# Patient Record
Sex: FEMALE | Race: BLACK OR AFRICAN AMERICAN | NOT HISPANIC OR LATINO | Employment: OTHER | URBAN - METROPOLITAN AREA
[De-identification: names, ages, dates, MRNs, and addresses within clinical notes are randomized per-mention and may not be internally consistent; named-entity substitution may affect disease eponyms.]

---

## 2023-02-22 LAB
ALANINE AMINOTRANSFERASE (SGPT) (U/L) IN SER/PLAS: 10 U/L (ref 7–45)
ALBUMIN (G/DL) IN SER/PLAS: 3.8 G/DL (ref 3.4–5)
ALKALINE PHOSPHATASE (U/L) IN SER/PLAS: 81 U/L (ref 33–136)
ANION GAP IN SER/PLAS: 15 MMOL/L (ref 10–20)
ASPARTATE AMINOTRANSFERASE (SGOT) (U/L) IN SER/PLAS: 13 U/L (ref 9–39)
BILIRUBIN TOTAL (MG/DL) IN SER/PLAS: 0.5 MG/DL (ref 0–1.2)
CALCIDIOL (25 OH VITAMIN D3) (NG/ML) IN SER/PLAS: 27 NG/ML
CALCIUM (MG/DL) IN SER/PLAS: 9.3 MG/DL (ref 8.6–10.6)
CARBON DIOXIDE, TOTAL (MMOL/L) IN SER/PLAS: 27 MMOL/L (ref 21–32)
CHLORIDE (MMOL/L) IN SER/PLAS: 103 MMOL/L (ref 98–107)
CHOLESTEROL (MG/DL) IN SER/PLAS: 202 MG/DL (ref 0–199)
CHOLESTEROL IN HDL (MG/DL) IN SER/PLAS: 45.4 MG/DL
CHOLESTEROL/HDL RATIO: 4.4
CREATININE (MG/DL) IN SER/PLAS: 1.13 MG/DL (ref 0.5–1.05)
GFR FEMALE: 52 ML/MIN/1.73M2
GLUCOSE (MG/DL) IN SER/PLAS: 90 MG/DL (ref 74–99)
LDL: 133 MG/DL (ref 0–99)
POTASSIUM (MMOL/L) IN SER/PLAS: 3.8 MMOL/L (ref 3.5–5.3)
PROTEIN TOTAL: 7.3 G/DL (ref 6.4–8.2)
SODIUM (MMOL/L) IN SER/PLAS: 141 MMOL/L (ref 136–145)
TRIGLYCERIDE (MG/DL) IN SER/PLAS: 119 MG/DL (ref 0–149)
UREA NITROGEN (MG/DL) IN SER/PLAS: 16 MG/DL (ref 6–23)
VLDL: 24 MG/DL (ref 0–40)

## 2023-03-22 DIAGNOSIS — I10 HYPERTENSION, UNSPECIFIED TYPE: ICD-10-CM

## 2023-03-22 RX ORDER — CHLORTHALIDONE 25 MG/1
1 TABLET ORAL DAILY
COMMUNITY
Start: 2022-06-20 | End: 2023-03-22 | Stop reason: SDUPTHER

## 2023-03-22 RX ORDER — CHOLECALCIFEROL (VITAMIN D3) 25 MCG
1 TABLET ORAL DAILY
COMMUNITY
Start: 2013-06-17 | End: 2023-05-23 | Stop reason: SDUPTHER

## 2023-03-22 RX ORDER — LOSARTAN POTASSIUM 100 MG/1
100 TABLET ORAL DAILY
Qty: 90 TABLET | Refills: 3 | Status: SHIPPED | OUTPATIENT
Start: 2023-03-22 | End: 2023-05-23 | Stop reason: SDUPTHER

## 2023-03-22 RX ORDER — MELOXICAM 15 MG/1
1 TABLET ORAL DAILY
COMMUNITY
Start: 2012-05-24 | End: 2023-05-23 | Stop reason: ALTCHOICE

## 2023-03-22 RX ORDER — CHLORTHALIDONE 25 MG/1
25 TABLET ORAL DAILY
Qty: 90 TABLET | Refills: 3 | Status: SHIPPED | OUTPATIENT
Start: 2023-03-22 | End: 2023-05-23 | Stop reason: SDUPTHER

## 2023-03-22 RX ORDER — LOSARTAN POTASSIUM 100 MG/1
1 TABLET ORAL DAILY
COMMUNITY
Start: 2012-07-16 | End: 2023-03-22 | Stop reason: SDUPTHER

## 2023-05-23 ENCOUNTER — OFFICE VISIT (OUTPATIENT)
Dept: PRIMARY CARE | Facility: CLINIC | Age: 73
End: 2023-05-23
Payer: MEDICARE

## 2023-05-23 VITALS
SYSTOLIC BLOOD PRESSURE: 112 MMHG | OXYGEN SATURATION: 97 % | WEIGHT: 205 LBS | BODY MASS INDEX: 34.16 KG/M2 | HEART RATE: 67 BPM | DIASTOLIC BLOOD PRESSURE: 60 MMHG | HEIGHT: 65 IN | RESPIRATION RATE: 12 BRPM

## 2023-05-23 DIAGNOSIS — H93.13 TINNITUS OF BOTH EARS: ICD-10-CM

## 2023-05-23 DIAGNOSIS — R06.00 DYSPNEA, UNSPECIFIED TYPE: ICD-10-CM

## 2023-05-23 DIAGNOSIS — I15.9 SECONDARY HYPERTENSION: ICD-10-CM

## 2023-05-23 DIAGNOSIS — I10 HYPERTENSION, UNSPECIFIED TYPE: ICD-10-CM

## 2023-05-23 DIAGNOSIS — R94.31 ABNORMAL EKG: ICD-10-CM

## 2023-05-23 DIAGNOSIS — E55.9 VITAMIN D DEFICIENCY: ICD-10-CM

## 2023-05-23 DIAGNOSIS — E78.5 HYPERLIPIDEMIA, UNSPECIFIED HYPERLIPIDEMIA TYPE: ICD-10-CM

## 2023-05-23 DIAGNOSIS — R73.9 HYPERGLYCEMIA: ICD-10-CM

## 2023-05-23 DIAGNOSIS — R42 DIZZINESS: Primary | ICD-10-CM

## 2023-05-23 DIAGNOSIS — M17.9 OSTEOARTHRITIS OF KNEE, UNSPECIFIED LATERALITY, UNSPECIFIED OSTEOARTHRITIS TYPE: ICD-10-CM

## 2023-05-23 DIAGNOSIS — E66.9 OBESITY, UNSPECIFIED CLASSIFICATION, UNSPECIFIED OBESITY TYPE, UNSPECIFIED WHETHER SERIOUS COMORBIDITY PRESENT: ICD-10-CM

## 2023-05-23 PROBLEM — C50.919 BREAST CANCER (MULTI): Status: ACTIVE | Noted: 2023-05-23

## 2023-05-23 PROBLEM — H53.8 BLURRY VISION: Status: ACTIVE | Noted: 2023-05-23

## 2023-05-23 PROBLEM — L60.0 INGROWN TOENAIL: Status: ACTIVE | Noted: 2023-05-23

## 2023-05-23 PROBLEM — M79.674 PAIN OF TOE OF RIGHT FOOT: Status: ACTIVE | Noted: 2018-11-01

## 2023-05-23 PROBLEM — M21.6X9 ACQUIRED EQUINUS DEFORMITY OF FOOT: Status: ACTIVE | Noted: 2018-11-01

## 2023-05-23 PROBLEM — M54.50 LUMBAGO: Status: ACTIVE | Noted: 2023-05-23

## 2023-05-23 PROBLEM — M19.90 OSTEOARTHROSIS: Status: ACTIVE | Noted: 2023-05-23

## 2023-05-23 PROBLEM — R51.9 HEADACHE: Status: ACTIVE | Noted: 2023-05-23

## 2023-05-23 PROBLEM — M25.559 HIP PAIN: Status: ACTIVE | Noted: 2023-05-23

## 2023-05-23 PROBLEM — M51.36 DEGENERATION OF LUMBAR INTERVERTEBRAL DISC: Status: ACTIVE | Noted: 2023-05-23

## 2023-05-23 PROBLEM — M51.369 DEGENERATION OF LUMBAR INTERVERTEBRAL DISC: Status: ACTIVE | Noted: 2023-05-23

## 2023-05-23 PROBLEM — H81.399 PERIPHERAL VERTIGO: Status: ACTIVE | Noted: 2023-05-23

## 2023-05-23 PROBLEM — M47.812 SPONDYLOSIS OF CERVICAL REGION WITHOUT MYELOPATHY OR RADICULOPATHY: Status: ACTIVE | Noted: 2023-05-23

## 2023-05-23 PROBLEM — R43.2 TASTE IMPAIRMENT: Status: ACTIVE | Noted: 2023-05-23

## 2023-05-23 PROBLEM — M72.2 PLANTAR FASCIITIS: Status: ACTIVE | Noted: 2018-11-01

## 2023-05-23 PROBLEM — R25.2 MUSCLE CRAMP: Status: ACTIVE | Noted: 2023-05-23

## 2023-05-23 PROCEDURE — 1036F TOBACCO NON-USER: CPT | Performed by: INTERNAL MEDICINE

## 2023-05-23 PROCEDURE — 99214 OFFICE O/P EST MOD 30 MIN: CPT | Performed by: INTERNAL MEDICINE

## 2023-05-23 PROCEDURE — 3078F DIAST BP <80 MM HG: CPT | Performed by: INTERNAL MEDICINE

## 2023-05-23 PROCEDURE — 3074F SYST BP LT 130 MM HG: CPT | Performed by: INTERNAL MEDICINE

## 2023-05-23 PROCEDURE — 1159F MED LIST DOCD IN RCRD: CPT | Performed by: INTERNAL MEDICINE

## 2023-05-23 RX ORDER — LOSARTAN POTASSIUM 100 MG/1
100 TABLET ORAL DAILY
Qty: 90 TABLET | Refills: 3 | Status: SHIPPED | OUTPATIENT
Start: 2023-05-23

## 2023-05-23 RX ORDER — CHOLECALCIFEROL (VITAMIN D3) 25 MCG
25 TABLET ORAL DAILY
Qty: 90 TABLET | Refills: 3 | Status: SHIPPED | OUTPATIENT
Start: 2023-05-23

## 2023-05-23 RX ORDER — CHLORTHALIDONE 25 MG/1
25 TABLET ORAL DAILY
Qty: 90 TABLET | Refills: 3 | Status: SHIPPED | OUTPATIENT
Start: 2023-05-23

## 2023-05-23 NOTE — PROGRESS NOTES
"Subjective   Chief complaint: Christine Fong is a 72 y.o. female who presents for Follow-up (Pt is here for follow up on ringing in ears and dizziness. ).    HPI:  72 years old black female history of hypertension, hyperlipidemia she has been experiencing some ringing in her ears with some dizziness she had a wax last visit and she had it removed patient still feels the ear is popping in and out.  Patient is concerned about her heart status or cardiac status because her EKG was slightly abnormal she has dyspnea exertion and she would like to have more work-up done to be sure that she is okay as far as her heart.        Objective   /60   Pulse 67   Resp 12   Ht 1.651 m (5' 5\")   Wt 93 kg (205 lb)   SpO2 97%   BMI 34.11 kg/m²   Physical Exam  Vitals reviewed.   Constitutional:       Appearance: Normal appearance.   HENT:      Head: Normocephalic and atraumatic.   Cardiovascular:      Rate and Rhythm: Normal rate and regular rhythm.   Pulmonary:      Effort: Pulmonary effort is normal.      Breath sounds: Normal breath sounds.   Abdominal:      General: Bowel sounds are normal.      Palpations: Abdomen is soft.   Musculoskeletal:      Cervical back: Neck supple.   Skin:     General: Skin is warm and dry.   Neurological:      General: No focal deficit present.      Mental Status: She is alert.   Psychiatric:         Mood and Affect: Mood normal.         Behavior: Behavior is cooperative.         I have reviewed and reconciled the medication list with the patient today.   Current Outpatient Medications:     chlorthalidone (Hygroton) 25 mg tablet, Take 1 tablet (25 mg) by mouth once daily., Disp: 90 tablet, Rfl: 3    cholecalciferol (Vitamin D-3) 25 MCG (1000 UT) tablet, Take 1 tablet (25 mcg) by mouth once daily., Disp: , Rfl:     losartan (Cozaar) 100 mg tablet, Take 1 tablet (100 mg) by mouth once daily., Disp: 90 tablet, Rfl: 3     Imaging:  No results found.     Labs reviewed:    Lab Results   Component " Value Date    WBC 5.2 11/03/2022    HGB 12.1 11/03/2022    HCT 37.6 11/03/2022     11/03/2022    CHOL 202 (H) 02/22/2023    TRIG 119 02/22/2023    HDL 45.4 02/22/2023    ALT 10 02/22/2023    AST 13 02/22/2023     02/22/2023    K 3.8 02/22/2023     02/22/2023    CREATININE 1.13 (H) 02/22/2023    BUN 16 02/22/2023    CO2 27 02/22/2023    TSH 1.32 11/03/2022    HGBA1C 5.2 11/03/2022       Assessment/Plan   Problem List Items Addressed This Visit          Respiratory    Dyspnea     Stress test and echocardiogram            Circulatory    Hypertension     Continue losartan and chlorthalidone Hygroton for diuretics low-salt diet            Musculoskeletal    Osteoarthrosis       Endocrine/Metabolic    Obesity       Other    Dizziness - Primary     Lightheadedness off and on there is no really evidence of vertigo.         Hyperglycemia    Hyperlipidemia     Following low-fat diet and taking flaxseed         Tinnitus of both ears       Continue current medications as listed  Follow up in in a week to do a CMP lipid profile and vitamin D.

## 2023-05-26 ENCOUNTER — LAB (OUTPATIENT)
Dept: LAB | Facility: LAB | Age: 73
End: 2023-05-26
Payer: MEDICARE

## 2023-05-26 DIAGNOSIS — I10 HYPERTENSION, UNSPECIFIED TYPE: ICD-10-CM

## 2023-05-26 DIAGNOSIS — E55.9 VITAMIN D DEFICIENCY: ICD-10-CM

## 2023-05-26 LAB
ALANINE AMINOTRANSFERASE (SGPT) (U/L) IN SER/PLAS: 14 U/L (ref 7–45)
ALBUMIN (G/DL) IN SER/PLAS: 3.9 G/DL (ref 3.4–5)
ALKALINE PHOSPHATASE (U/L) IN SER/PLAS: 83 U/L (ref 33–136)
ANION GAP IN SER/PLAS: 12 MMOL/L (ref 10–20)
ASPARTATE AMINOTRANSFERASE (SGOT) (U/L) IN SER/PLAS: 16 U/L (ref 9–39)
BILIRUBIN TOTAL (MG/DL) IN SER/PLAS: 0.7 MG/DL (ref 0–1.2)
CALCIDIOL (25 OH VITAMIN D3) (NG/ML) IN SER/PLAS: 35 NG/ML
CALCIUM (MG/DL) IN SER/PLAS: 9.4 MG/DL (ref 8.6–10.6)
CARBON DIOXIDE, TOTAL (MMOL/L) IN SER/PLAS: 29 MMOL/L (ref 21–32)
CHLORIDE (MMOL/L) IN SER/PLAS: 103 MMOL/L (ref 98–107)
CHOLESTEROL (MG/DL) IN SER/PLAS: 207 MG/DL (ref 0–199)
CHOLESTEROL IN HDL (MG/DL) IN SER/PLAS: 50.4 MG/DL
CHOLESTEROL/HDL RATIO: 4.1
CREATININE (MG/DL) IN SER/PLAS: 0.93 MG/DL (ref 0.5–1.05)
GFR FEMALE: 66 ML/MIN/1.73M2
GLUCOSE (MG/DL) IN SER/PLAS: 87 MG/DL (ref 74–99)
LDL: 130 MG/DL (ref 0–99)
POTASSIUM (MMOL/L) IN SER/PLAS: 3.5 MMOL/L (ref 3.5–5.3)
PROTEIN TOTAL: 7.3 G/DL (ref 6.4–8.2)
SODIUM (MMOL/L) IN SER/PLAS: 140 MMOL/L (ref 136–145)
TRIGLYCERIDE (MG/DL) IN SER/PLAS: 132 MG/DL (ref 0–149)
UREA NITROGEN (MG/DL) IN SER/PLAS: 13 MG/DL (ref 6–23)
VLDL: 26 MG/DL (ref 0–40)

## 2023-05-26 PROCEDURE — 82306 VITAMIN D 25 HYDROXY: CPT

## 2023-05-26 PROCEDURE — 80053 COMPREHEN METABOLIC PANEL: CPT

## 2023-05-26 PROCEDURE — 80061 LIPID PANEL: CPT

## 2023-05-26 PROCEDURE — 36415 COLL VENOUS BLD VENIPUNCTURE: CPT

## 2023-05-31 ENCOUNTER — OFFICE VISIT (OUTPATIENT)
Dept: PRIMARY CARE | Facility: CLINIC | Age: 73
End: 2023-05-31
Payer: MEDICARE

## 2023-05-31 VITALS
HEIGHT: 65 IN | OXYGEN SATURATION: 96 % | RESPIRATION RATE: 12 BRPM | DIASTOLIC BLOOD PRESSURE: 62 MMHG | BODY MASS INDEX: 34.32 KG/M2 | HEART RATE: 70 BPM | SYSTOLIC BLOOD PRESSURE: 100 MMHG | WEIGHT: 206 LBS

## 2023-05-31 DIAGNOSIS — E55.9 VITAMIN D DEFICIENCY: ICD-10-CM

## 2023-05-31 DIAGNOSIS — E78.5 HYPERLIPIDEMIA, UNSPECIFIED HYPERLIPIDEMIA TYPE: ICD-10-CM

## 2023-05-31 DIAGNOSIS — Z00.00 ANNUAL PHYSICAL EXAM: ICD-10-CM

## 2023-05-31 DIAGNOSIS — I15.9 SECONDARY HYPERTENSION: ICD-10-CM

## 2023-05-31 DIAGNOSIS — M17.9 OSTEOARTHRITIS OF KNEE, UNSPECIFIED LATERALITY, UNSPECIFIED OSTEOARTHRITIS TYPE: ICD-10-CM

## 2023-05-31 DIAGNOSIS — R73.9 HYPERGLYCEMIA: ICD-10-CM

## 2023-05-31 DIAGNOSIS — C50.919 MALIGNANT NEOPLASM OF FEMALE BREAST, UNSPECIFIED ESTROGEN RECEPTOR STATUS, UNSPECIFIED LATERALITY, UNSPECIFIED SITE OF BREAST (MULTI): ICD-10-CM

## 2023-05-31 DIAGNOSIS — I15.9 SECONDARY HYPERTENSION: Primary | ICD-10-CM

## 2023-05-31 PROCEDURE — 1159F MED LIST DOCD IN RCRD: CPT | Performed by: INTERNAL MEDICINE

## 2023-05-31 PROCEDURE — 3078F DIAST BP <80 MM HG: CPT | Performed by: INTERNAL MEDICINE

## 2023-05-31 PROCEDURE — 3074F SYST BP LT 130 MM HG: CPT | Performed by: INTERNAL MEDICINE

## 2023-05-31 PROCEDURE — 99214 OFFICE O/P EST MOD 30 MIN: CPT | Performed by: INTERNAL MEDICINE

## 2023-05-31 PROCEDURE — 1036F TOBACCO NON-USER: CPT | Performed by: INTERNAL MEDICINE

## 2023-05-31 NOTE — PROGRESS NOTES
"Subjective   Chief complaint: Christine Fong is a 72 y.o. female who presents for Follow-up (Pt is being seen today for blood work follow up).    HPI:  Christine has a history of Htn and HLD is here to discuss blood work today. No current complainants.        Objective   /62   Pulse 70   Resp 12   Ht 1.651 m (5' 5\")   Wt 93.4 kg (206 lb)   SpO2 96%   BMI 34.28 kg/m²   Physical Exam  Vitals reviewed.   Constitutional:       Appearance: Normal appearance.   HENT:      Head: Normocephalic and atraumatic.      Mouth/Throat:      Mouth: Mucous membranes are moist.   Eyes:      Conjunctiva/sclera: Conjunctivae normal.      Pupils: Pupils are equal, round, and reactive to light.   Cardiovascular:      Rate and Rhythm: Normal rate.      Pulses: Normal pulses.   Pulmonary:      Effort: Pulmonary effort is normal.      Breath sounds: Normal breath sounds.   Abdominal:      General: Abdomen is flat.      Palpations: Abdomen is soft.   Musculoskeletal:      Cervical back: Normal range of motion.   Skin:     General: Skin is warm and dry.   Neurological:      Mental Status: She is alert and oriented to person, place, and time.   Psychiatric:         Mood and Affect: Mood normal.         Behavior: Behavior normal.         I have reviewed and reconciled the medication list with the patient today.   Current Outpatient Medications:     chlorthalidone (Hygroton) 25 mg tablet, Take 1 tablet (25 mg) by mouth once daily., Disp: 90 tablet, Rfl: 3    cholecalciferol (Vitamin D-3) 25 MCG (1000 UT) tablet, Take 1 tablet (25 mcg) by mouth once daily., Disp: 90 tablet, Rfl: 3    losartan (Cozaar) 100 mg tablet, Take 1 tablet (100 mg) by mouth once daily., Disp: 90 tablet, Rfl: 3     Imaging:  No results found.     Labs reviewed:    Lab Results   Component Value Date    WBC 5.2 11/03/2022    HGB 12.1 11/03/2022    HCT 37.6 11/03/2022     11/03/2022    CHOL 207 (H) 05/26/2023    TRIG 132 05/26/2023    HDL 50.4 05/26/2023    ALT " 14 05/26/2023    AST 16 05/26/2023     05/26/2023    K 3.5 05/26/2023     05/26/2023    CREATININE 0.93 05/26/2023    BUN 13 05/26/2023    CO2 29 05/26/2023    TSH 1.32 11/03/2022    HGBA1C 5.2 11/03/2022       Assessment/Plan   Problem List Items Addressed This Visit          Circulatory    Hypertension - Primary     Continue losartan and chlorthalidone Hygroton for diuretics low-salt diet             Endocrine/Metabolic    Vitamin D deficiency     Vitamin D 35 on 5/26/2023.  Continue weekly vitamin D supplements            Other    Hyperlipidemia     Continue low fat diet and flax seed            Continue current medications as listed  Follow up in 6 months for annual physical

## 2023-10-18 PROBLEM — H25.813 COMBINED FORM OF AGE-RELATED CATARACT, BOTH EYES: Status: ACTIVE | Noted: 2023-10-18

## 2023-10-18 PROBLEM — H52.03 HYPERMETROPIA, BILATERAL: Status: ACTIVE | Noted: 2023-10-18

## 2023-10-18 PROBLEM — H40.1131 PRIMARY OPEN ANGLE GLAUCOMA OF BOTH EYES, MILD STAGE: Status: ACTIVE | Noted: 2023-10-18

## 2023-10-18 PROBLEM — H52.223 REGULAR ASTIGMATISM OF BOTH EYES WITH PRESBYOPIA: Status: ACTIVE | Noted: 2023-10-18

## 2023-10-18 PROBLEM — H52.11 AXIAL MYOPIA OF RIGHT EYE: Status: ACTIVE | Noted: 2023-10-18

## 2023-10-18 PROBLEM — H52.4 REGULAR ASTIGMATISM OF BOTH EYES WITH PRESBYOPIA: Status: ACTIVE | Noted: 2023-10-18

## 2023-10-18 PROBLEM — H40.053 BILATERAL OCULAR HYPERTENSION: Status: ACTIVE | Noted: 2023-10-18

## 2023-10-18 PROBLEM — H04.123 DRY EYE SYNDROME, BILATERAL: Status: ACTIVE | Noted: 2023-10-18

## 2023-10-18 RX ORDER — LATANOPROST/PF 0.005 %
1 DROPS OPHTHALMIC (EYE) NIGHTLY
COMMUNITY
Start: 2023-09-19 | End: 2024-01-18 | Stop reason: SDUPTHER

## 2023-10-18 RX ORDER — ERGOCALCIFEROL 1.25 MG/1
1 CAPSULE ORAL
COMMUNITY
Start: 2022-11-08 | End: 2023-12-19 | Stop reason: ALTCHOICE

## 2023-10-19 ENCOUNTER — OFFICE VISIT (OUTPATIENT)
Dept: OPHTHALMOLOGY | Facility: CLINIC | Age: 73
End: 2023-10-19
Payer: MEDICARE

## 2023-10-19 DIAGNOSIS — H25.813 COMBINED FORM OF AGE-RELATED CATARACT, BOTH EYES: ICD-10-CM

## 2023-10-19 DIAGNOSIS — H40.003 GLAUCOMA SUSPECT OF BOTH EYES: Primary | ICD-10-CM

## 2023-10-19 DIAGNOSIS — H40.2232 CHRONIC PRIMARY ANGLE-CLOSURE GLAUCOMA OF BOTH EYES, MODERATE STAGE: ICD-10-CM

## 2023-10-19 DIAGNOSIS — H53.8 BLURRED VISION: ICD-10-CM

## 2023-10-19 LAB
AVERAGE RNFL TODAY (OD): 59
AVERAGE RNFL TODAY (OS): 61
C/D RATIO TODAY (OD): 0.72
C/D RATIO TODAY (OS): 0.77

## 2023-10-19 PROCEDURE — 92083 EXTENDED VISUAL FIELD XM: CPT | Performed by: OPHTHALMOLOGY

## 2023-10-19 PROCEDURE — 92133 CPTRZD OPH DX IMG PST SGM ON: CPT | Performed by: OPHTHALMOLOGY

## 2023-10-19 PROCEDURE — 92012 INTRM OPH EXAM EST PATIENT: CPT | Performed by: OPHTHALMOLOGY

## 2023-10-19 ASSESSMENT — SLIT LAMP EXAM - LIDS
COMMENTS: NORMAL
COMMENTS: NORMAL

## 2023-10-19 ASSESSMENT — TONOMETRY
IOP_METHOD: GOLDMANN APPLANATION
OD_IOP_MMHG: 16
OS_IOP_MMHG: 17

## 2023-10-19 ASSESSMENT — VISUAL ACUITY
OD_SC: 20/25
METHOD: SNELLEN - LINEAR
OS_SC: 20/20-2

## 2023-10-19 ASSESSMENT — EXTERNAL EXAM - LEFT EYE: OS_EXAM: NORMAL

## 2023-10-19 ASSESSMENT — EXTERNAL EXAM - RIGHT EYE: OD_EXAM: NORMAL

## 2023-10-19 NOTE — PROGRESS NOTES
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

## 2023-12-04 ENCOUNTER — APPOINTMENT (OUTPATIENT)
Dept: PRIMARY CARE | Facility: CLINIC | Age: 73
End: 2023-12-04
Payer: MEDICARE

## 2023-12-14 ENCOUNTER — CLINICAL SUPPORT (OUTPATIENT)
Dept: PRIMARY CARE | Facility: CLINIC | Age: 73
End: 2023-12-14
Payer: MEDICARE

## 2023-12-14 DIAGNOSIS — I10 BENIGN ESSENTIAL HYPERTENSION: ICD-10-CM

## 2023-12-14 DIAGNOSIS — R73.9 HYPERGLYCEMIA: ICD-10-CM

## 2023-12-14 DIAGNOSIS — Z00.00 ENCOUNTER FOR ANNUAL WELLNESS VISIT (AWV) IN MEDICARE PATIENT: ICD-10-CM

## 2023-12-14 DIAGNOSIS — E78.5 HYPERLIPIDEMIA, UNSPECIFIED HYPERLIPIDEMIA TYPE: ICD-10-CM

## 2023-12-14 DIAGNOSIS — E03.9 HYPOTHYROIDISM, UNSPECIFIED TYPE: ICD-10-CM

## 2023-12-14 DIAGNOSIS — D50.9 IRON DEFICIENCY ANEMIA, UNSPECIFIED IRON DEFICIENCY ANEMIA TYPE: ICD-10-CM

## 2023-12-14 DIAGNOSIS — E55.9 VITAMIN D DEFICIENCY: ICD-10-CM

## 2023-12-14 DIAGNOSIS — E78.00 ELEVATED LDL CHOLESTEROL LEVEL: ICD-10-CM

## 2023-12-14 DIAGNOSIS — I10 PRIMARY HYPERTENSION: ICD-10-CM

## 2023-12-14 LAB
25(OH)D3 SERPL-MCNC: 18 NG/ML (ref 30–100)
ALBUMIN SERPL BCP-MCNC: 3.9 G/DL (ref 3.4–5)
ALP SERPL-CCNC: 92 U/L (ref 33–136)
ALT SERPL W P-5'-P-CCNC: 14 U/L (ref 7–45)
ANION GAP SERPL CALC-SCNC: 17 MMOL/L (ref 10–20)
AST SERPL W P-5'-P-CCNC: 21 U/L (ref 9–39)
BILIRUB SERPL-MCNC: 0.9 MG/DL (ref 0–1.2)
BUN SERPL-MCNC: 15 MG/DL (ref 6–23)
CALCIUM SERPL-MCNC: 8.7 MG/DL (ref 8.6–10.6)
CHLORIDE SERPL-SCNC: 100 MMOL/L (ref 98–107)
CHOLEST SERPL-MCNC: 215 MG/DL (ref 0–199)
CHOLESTEROL/HDL RATIO: 2.6
CO2 SERPL-SCNC: 23 MMOL/L (ref 21–32)
CREAT SERPL-MCNC: 0.96 MG/DL (ref 0.5–1.05)
ERYTHROCYTE [DISTWIDTH] IN BLOOD BY AUTOMATED COUNT: 16 % (ref 11.5–14.5)
EST. AVERAGE GLUCOSE BLD GHB EST-MCNC: 103 MG/DL
GFR SERPL CREATININE-BSD FRML MDRD: 63 ML/MIN/1.73M*2
GLUCOSE SERPL-MCNC: 112 MG/DL (ref 74–99)
HBA1C MFR BLD: 5.2 %
HCT VFR BLD AUTO: 39.3 % (ref 36–46)
HDLC SERPL-MCNC: 82.9 MG/DL
HGB BLD-MCNC: 12.5 G/DL (ref 12–16)
LDLC SERPL CALC-MCNC: 112 MG/DL
MCH RBC QN AUTO: 32 PG (ref 26–34)
MCHC RBC AUTO-ENTMCNC: 31.8 G/DL (ref 32–36)
MCV RBC AUTO: 101 FL (ref 80–100)
NON HDL CHOLESTEROL: 132 MG/DL (ref 0–149)
NRBC BLD-RTO: 0 /100 WBCS (ref 0–0)
PLATELET # BLD AUTO: 249 X10*3/UL (ref 150–450)
POTASSIUM SERPL-SCNC: 3.3 MMOL/L (ref 3.5–5.3)
PROT SERPL-MCNC: 7.5 G/DL (ref 6.4–8.2)
RBC # BLD AUTO: 3.91 X10*6/UL (ref 4–5.2)
SODIUM SERPL-SCNC: 137 MMOL/L (ref 136–145)
TRIGL SERPL-MCNC: 99 MG/DL (ref 0–149)
TSH SERPL-ACNC: 1.88 MIU/L (ref 0.44–3.98)
VLDL: 20 MG/DL (ref 0–40)
WBC # BLD AUTO: 5.4 X10*3/UL (ref 4.4–11.3)

## 2023-12-14 PROCEDURE — 80061 LIPID PANEL: CPT

## 2023-12-14 PROCEDURE — 82306 VITAMIN D 25 HYDROXY: CPT

## 2023-12-14 PROCEDURE — 80053 COMPREHEN METABOLIC PANEL: CPT

## 2023-12-14 PROCEDURE — 84443 ASSAY THYROID STIM HORMONE: CPT

## 2023-12-14 PROCEDURE — 85027 COMPLETE CBC AUTOMATED: CPT

## 2023-12-14 PROCEDURE — 83036 HEMOGLOBIN GLYCOSYLATED A1C: CPT

## 2023-12-14 PROCEDURE — 36415 COLL VENOUS BLD VENIPUNCTURE: CPT

## 2023-12-19 ENCOUNTER — OFFICE VISIT (OUTPATIENT)
Dept: PRIMARY CARE | Facility: CLINIC | Age: 73
End: 2023-12-19
Payer: MEDICARE

## 2023-12-19 VITALS
SYSTOLIC BLOOD PRESSURE: 98 MMHG | HEIGHT: 65 IN | OXYGEN SATURATION: 95 % | BODY MASS INDEX: 34.32 KG/M2 | WEIGHT: 206 LBS | HEART RATE: 73 BPM | DIASTOLIC BLOOD PRESSURE: 63 MMHG | RESPIRATION RATE: 14 BRPM

## 2023-12-19 DIAGNOSIS — I15.9 SECONDARY HYPERTENSION: ICD-10-CM

## 2023-12-19 DIAGNOSIS — E78.5 HYPERLIPIDEMIA, UNSPECIFIED HYPERLIPIDEMIA TYPE: Primary | ICD-10-CM

## 2023-12-19 DIAGNOSIS — E55.9 VITAMIN D DEFICIENCY: ICD-10-CM

## 2023-12-19 DIAGNOSIS — Z23 NEEDS FLU SHOT: ICD-10-CM

## 2023-12-19 DIAGNOSIS — E87.6 HYPOKALEMIA: ICD-10-CM

## 2023-12-19 DIAGNOSIS — Z00.00 MEDICARE ANNUAL WELLNESS VISIT, SUBSEQUENT: ICD-10-CM

## 2023-12-19 LAB
POC APPEARANCE, URINE: CLEAR
POC BILIRUBIN, URINE: NEGATIVE
POC BLOOD, URINE: NEGATIVE
POC COLOR, URINE: YELLOW
POC GLUCOSE, URINE: NEGATIVE MG/DL
POC KETONES, URINE: NEGATIVE MG/DL
POC LEUKOCYTES, URINE: NEGATIVE
POC NITRITE,URINE: NEGATIVE
POC PH, URINE: 6 PH
POC PROTEIN, URINE: NEGATIVE MG/DL
POC SPECIFIC GRAVITY, URINE: 1.01
POC UROBILINOGEN, URINE: 0.2 EU/DL

## 2023-12-19 PROCEDURE — 1159F MED LIST DOCD IN RCRD: CPT | Performed by: INTERNAL MEDICINE

## 2023-12-19 PROCEDURE — 1126F AMNT PAIN NOTED NONE PRSNT: CPT | Performed by: INTERNAL MEDICINE

## 2023-12-19 PROCEDURE — 1036F TOBACCO NON-USER: CPT | Performed by: INTERNAL MEDICINE

## 2023-12-19 PROCEDURE — G0439 PPPS, SUBSEQ VISIT: HCPCS | Performed by: INTERNAL MEDICINE

## 2023-12-19 PROCEDURE — 99214 OFFICE O/P EST MOD 30 MIN: CPT | Performed by: INTERNAL MEDICINE

## 2023-12-19 PROCEDURE — G0008 ADMIN INFLUENZA VIRUS VAC: HCPCS | Performed by: INTERNAL MEDICINE

## 2023-12-19 PROCEDURE — 90662 IIV NO PRSV INCREASED AG IM: CPT | Performed by: INTERNAL MEDICINE

## 2023-12-19 PROCEDURE — 93000 ELECTROCARDIOGRAM COMPLETE: CPT | Performed by: INTERNAL MEDICINE

## 2023-12-19 PROCEDURE — 81002 URINALYSIS NONAUTO W/O SCOPE: CPT | Performed by: INTERNAL MEDICINE

## 2023-12-19 PROCEDURE — 1170F FXNL STATUS ASSESSED: CPT | Performed by: INTERNAL MEDICINE

## 2023-12-19 PROCEDURE — 3074F SYST BP LT 130 MM HG: CPT | Performed by: INTERNAL MEDICINE

## 2023-12-19 PROCEDURE — 3078F DIAST BP <80 MM HG: CPT | Performed by: INTERNAL MEDICINE

## 2023-12-19 RX ORDER — FLAXSEED OIL 1000 MG
1 CAPSULE ORAL
Qty: 90 CAPSULE | Refills: 3 | Status: SHIPPED | OUTPATIENT
Start: 2023-12-19

## 2023-12-19 RX ORDER — ERGOCALCIFEROL 1.25 MG/1
50000 CAPSULE ORAL
Qty: 12 CAPSULE | Refills: 0 | Status: SHIPPED | OUTPATIENT
Start: 2023-12-19 | End: 2024-03-12

## 2023-12-19 ASSESSMENT — PATIENT HEALTH QUESTIONNAIRE - PHQ9
2. FEELING DOWN, DEPRESSED OR HOPELESS: NOT AT ALL
SUM OF ALL RESPONSES TO PHQ9 QUESTIONS 1 & 2: 0
1. LITTLE INTEREST OR PLEASURE IN DOING THINGS: NOT AT ALL

## 2023-12-19 ASSESSMENT — ANXIETY QUESTIONNAIRES
1. FEELING NERVOUS, ANXIOUS, OR ON EDGE: NOT AT ALL
6. BECOMING EASILY ANNOYED OR IRRITABLE: NOT AT ALL
5. BEING SO RESTLESS THAT IT IS HARD TO SIT STILL: NOT AT ALL
GAD7 TOTAL SCORE: 0
2. NOT BEING ABLE TO STOP OR CONTROL WORRYING: NOT AT ALL
IF YOU CHECKED OFF ANY PROBLEMS ON THIS QUESTIONNAIRE, HOW DIFFICULT HAVE THESE PROBLEMS MADE IT FOR YOU TO DO YOUR WORK, TAKE CARE OF THINGS AT HOME, OR GET ALONG WITH OTHER PEOPLE: NOT DIFFICULT AT ALL
7. FEELING AFRAID AS IF SOMETHING AWFUL MIGHT HAPPEN: NOT AT ALL
4. TROUBLE RELAXING: NOT AT ALL
3. WORRYING TOO MUCH ABOUT DIFFERENT THINGS: NOT AT ALL

## 2023-12-19 ASSESSMENT — LIFESTYLE VARIABLES
HOW OFTEN DO YOU HAVE A DRINK CONTAINING ALCOHOL: NEVER
HOW OFTEN DO YOU HAVE SIX OR MORE DRINKS ON ONE OCCASION: NEVER
HOW MANY STANDARD DRINKS CONTAINING ALCOHOL DO YOU HAVE ON A TYPICAL DAY: PATIENT DOES NOT DRINK
SKIP TO QUESTIONS 9-10: 1
AUDIT-C TOTAL SCORE: 0

## 2023-12-19 ASSESSMENT — ACTIVITIES OF DAILY LIVING (ADL)
BATHING: INDEPENDENT
PATIENT'S MEMORY ADEQUATE TO SAFELY COMPLETE DAILY ACTIVITIES?: YES
NEEDS ASSISTANCE WITH FOOD: INDEPENDENT
GROOMING: INDEPENDENT
GROCERY SHOPPING: INDEPENDENT
PREPARING MEALS: INDEPENDENT
PILL BOX USED: NO
USING TELEPHONE: INDEPENDENT
DOING HOUSEWORK: INDEPENDENT
HEARING - RIGHT EAR: FUNCTIONAL
TAKING MEDICATION: INDEPENDENT
TOILETING: INDEPENDENT
WALKS IN HOME: INDEPENDENT
MANAGING FINANCES: INDEPENDENT
EATING: INDEPENDENT
STIL DRIVING: YES
ADEQUATE_TO_COMPLETE_ADL: YES
JUDGMENT_ADEQUATE_SAFELY_COMPLETE_DAILY_ACTIVITIES: YES
USING TRANSPORTATION: INDEPENDENT
DRESSING YOURSELF: INDEPENDENT
HEARING - LEFT EAR: FUNCTIONAL
FEEDING YOURSELF: INDEPENDENT

## 2023-12-19 ASSESSMENT — COLUMBIA-SUICIDE SEVERITY RATING SCALE - C-SSRS
6. HAVE YOU EVER DONE ANYTHING, STARTED TO DO ANYTHING, OR PREPARED TO DO ANYTHING TO END YOUR LIFE?: NO
2. HAVE YOU ACTUALLY HAD ANY THOUGHTS OF KILLING YOURSELF?: NO
1. IN THE PAST MONTH, HAVE YOU WISHED YOU WERE DEAD OR WISHED YOU COULD GO TO SLEEP AND NOT WAKE UP?: NO

## 2023-12-19 ASSESSMENT — ENCOUNTER SYMPTOMS
OCCASIONAL FEELINGS OF UNSTEADINESS: 0
DEPRESSION: 0
LOSS OF SENSATION IN FEET: 0

## 2023-12-19 ASSESSMENT — PAIN SCALES - GENERAL: PAINLEVEL: 0-NO PAIN

## 2023-12-19 NOTE — PROGRESS NOTES
Administered flu vaccine to patient intramuscularly in right deltoid. Pt tolerated well. No side effects noted

## 2023-12-19 NOTE — ASSESSMENT & PLAN NOTE
Pt BP officing reading is 98/63. Continue taking medications  Meds include Hygroton and losartan.

## 2023-12-19 NOTE — ASSESSMENT & PLAN NOTE
Pt office reading for potassium for this visit is 3.3.  Pt encouraged to consume high pottasium food such as banana.   Will recheck in the next visit.    DISPLAY PLAN FREE TEXT

## 2023-12-19 NOTE — ASSESSMENT & PLAN NOTE
Pt current lipid panel 215. She said is not taking flaxseed capsules.  Pt encouraged to take omega 3 flaxseed with meal 3 times a day.

## 2023-12-19 NOTE — ASSESSMENT & PLAN NOTE
Pt vitamin level for this visit is 18. Pt states she's not consistently taking medications.   Pt received ergocalciferol 26771 unit for this visit.

## 2023-12-19 NOTE — PROGRESS NOTES
"ANNUAL WELLNESS VISIT    Subjective :  Chief Complaint: Christine Fong is an 73 y.o. female here for an annual wellness visit and general medical care and f/u.     HPI:  72 y/o female with a past medical history of breast cancer and degenerative lumber intervertebral disc presenting to the office for annual check up. No pain for this visit and states has no concern.         Objective   BP 98/63   Pulse 73   Resp 14   Ht 1.651 m (5' 5\")   Wt 93.4 kg (206 lb)   SpO2 95%   BMI 34.28 kg/m²     Physical Exam  Constitutional:       Appearance: Normal appearance.   HENT:      Head: Normocephalic and atraumatic.      Right Ear: Tympanic membrane, ear canal and external ear normal.      Left Ear: Tympanic membrane, ear canal and external ear normal.      Nose: Nose normal.      Mouth/Throat:      Mouth: Mucous membranes are moist.      Pharynx: Oropharynx is clear.   Eyes:      Extraocular Movements: Extraocular movements intact.      Pupils: Pupils are equal, round, and reactive to light.   Cardiovascular:      Rate and Rhythm: Normal rate and regular rhythm.   Musculoskeletal:         General: Normal range of motion.      Cervical back: Normal range of motion and neck supple.   Skin:     General: Skin is warm.   Neurological:      General: No focal deficit present.      Mental Status: She is alert and oriented to person, place, and time.   Psychiatric:         Mood and Affect: Mood normal.         Behavior: Behavior normal.         Imaging:  No results found.     Labs reviewed:    Lab Results   Component Value Date    WBC 5.4 12/14/2023    HGB 12.5 12/14/2023    HCT 39.3 12/14/2023     12/14/2023    CHOL 215 (H) 12/14/2023    TRIG 99 12/14/2023    HDL 82.9 12/14/2023    ALT 14 12/14/2023    AST 21 12/14/2023     12/14/2023    K 3.3 (L) 12/14/2023     12/14/2023    CREATININE 0.96 12/14/2023    BUN 15 12/14/2023    CO2 23 12/14/2023    TSH 1.88 12/14/2023    HGBA1C 5.2 12/14/2023       Past Medical, " Surgical, and Family History reviewed and updated in chart.    I have reviewed and reconciled the medication list with the patient today.   Current Outpatient Medications:     chlorthalidone (Hygroton) 25 mg tablet, Take 1 tablet (25 mg) by mouth once daily., Disp: 90 tablet, Rfl: 3    cholecalciferol (Vitamin D-3) 25 MCG (1000 UT) tablet, Take 1 tablet (25 mcg) by mouth once daily., Disp: 90 tablet, Rfl: 3    FLAXSEED OIL ORAL, Take 1 capsule by mouth 3 times a day., Disp: , Rfl:     latanoprost, PF, 0.005 % drops, Administer 1 drop into affected eye(s) once daily at bedtime., Disp: , Rfl:     losartan (Cozaar) 100 mg tablet, Take 1 tablet (100 mg) by mouth once daily., Disp: 90 tablet, Rfl: 3     List of current healthcare providers:  Patient Care Team:  Steven Arias MD as PCP - General     HRA:  Over the past 2 weeks, how often have you been bothered by any of the following problems?  Little interest or pleasure in doing things: Not at all  Feeling down, depressed, or hopeless: Not at all  Patient Health Questionnaire-2 Score: 0    Steadi Fall Risk  One or more falls in the last year? No  How many Times?    Was the patient injured in the fall?    Has trouble stepping onto curb? No  Advised to use a cane or walker to get around safely? No  Often has to rush to toilet? No  Feels unsteady when walking? No  Has lost some feeling in feet? No  Often feels sad or depressed? No  Steadies self on furniture while walking at home? No  Takes medicine that makes them feel lightheaded or more tired than usual? No  Worried about Falling? No  Takes medicine to sleep or improve mood? No  Needs to push with hands when rising from a chair? No                                ADL Screening  Hearing - Right Ear: Functional  Hearing - Left Ear: Functional  Bathing: Independent  Dressing: Independent  Walks in Home: Independent    IADL's  Managing Finances: Independent  Grocery Shopping: Independent  Taking Medication:  Independent  Doing Housework: Independent    Assessment/Plan :  Problem List Items Addressed This Visit       Hyperlipidemia - Primary     Pt current lipid panel 215. She said is not taking flaxseed capsules.  Pt encouraged to take omega 3 flaxseed with meal 3 times a day.          Hypertension     Pt BP officing reading is 98/63. Continue taking medications  Meds include Hygroton and losartan.          Vitamin D deficiency     Pt vitamin level for this visit is 18. Pt states she's not consistently taking medications.   Pt received ergocalciferol 51083 unit for this visit.          Needs flu shot    Relevant Orders    Flu vaccine, quadrivalent, high-dose, preservative free, age 65y+ (FLUZONE) (Completed)    Medicare annual wellness visit, subsequent    Relevant Orders    POCT UA (nonautomated) manually resulted (Completed)    ECG 12 lead (Clinic Performed)    Hypokalemia     Pt office reading for potassium for this visit is 3.3.  Pt encouraged to consume high pottasium food such as banana.   Will recheck in the next visit.           The following health maintenance schedule was reviewed with the patient and provided in printed form in the after visit summary:  Health Maintenance   Topic Date Due    Medicare Annual Wellness Visit (AWV)  Never done    Bone Density Scan  Never done    Colorectal Cancer Screening  Never done    Hepatitis C Screening  Never done    DTaP/Tdap/Td Vaccines (1 - Tdap) Never done    Zoster Vaccines (1 of 2) Never done    COVID-19 Vaccine (4 - Moderna series) 03/04/2022    Pneumococcal Vaccine: 65+ Years (2 - PPSV23 or PCV20) 03/29/2022    TSH Level  12/14/2024    Diabetes Screening  12/14/2024    Lipid Panel  12/14/2028    Influenza Vaccine  Completed    HIB Vaccines  Aged Out    Hepatitis B Vaccines  Aged Out    IPV Vaccines  Aged Out    Hepatitis A Vaccines  Aged Out    Meningococcal Vaccine  Aged Out    Rotavirus Vaccines  Aged Out    HPV Vaccines  Aged Out       Advance Care Planning               Orders Placed This Encounter   Procedures    Flu vaccine, quadrivalent, high-dose, preservative free, age 65y+ (FLUZONE)    POCT UA (nonautomated) manually resulted     Order Specific Question:   Release result to [facility]     Answer:   Immediate [1]    ECG 12 lead (Clinic Performed)     Pt received flu shot right deltoid.   Continue current medications as listed  Follow up in April 2024.

## 2024-01-18 ENCOUNTER — OFFICE VISIT (OUTPATIENT)
Dept: OPHTHALMOLOGY | Facility: CLINIC | Age: 74
End: 2024-01-18
Payer: MEDICARE

## 2024-01-18 DIAGNOSIS — H52.221 REGULAR ASTIGMATISM OF RIGHT EYE: ICD-10-CM

## 2024-01-18 DIAGNOSIS — H40.1131 PRIMARY OPEN ANGLE GLAUCOMA (POAG) OF BOTH EYES, MILD STAGE: Primary | ICD-10-CM

## 2024-01-18 DIAGNOSIS — H53.8 BLURRED VISION: ICD-10-CM

## 2024-01-18 DIAGNOSIS — H25.813 COMBINED FORM OF AGE-RELATED CATARACT, BOTH EYES: ICD-10-CM

## 2024-01-18 DIAGNOSIS — H52.13 MYOPIA, BILATERAL: ICD-10-CM

## 2024-01-18 DIAGNOSIS — H52.4 PRESBYOPIA: ICD-10-CM

## 2024-01-18 PROCEDURE — 92012 INTRM OPH EXAM EST PATIENT: CPT | Performed by: OPHTHALMOLOGY

## 2024-01-18 RX ORDER — LATANOPROST/PF 0.005 %
1 DROPS OPHTHALMIC (EYE) NIGHTLY
Qty: 7.5 ML | Refills: 3 | Status: SHIPPED | OUTPATIENT
Start: 2024-01-18 | End: 2024-02-17

## 2024-01-18 ASSESSMENT — TONOMETRY
OD_IOP_MMHG: 14
IOP_METHOD: GOLDMANN APPLANATION
OS_IOP_MMHG: 15

## 2024-01-18 ASSESSMENT — EXTERNAL EXAM - RIGHT EYE: OD_EXAM: NORMAL

## 2024-01-18 ASSESSMENT — PACHYMETRY
OS_CT(UM): 521
OD_CT(UM): 533

## 2024-01-18 ASSESSMENT — REFRACTION_MANIFEST
OD_ADD: +2.75
OS_ADD: +2.75
OS_SPHERE: -0.50
OD_AXIS: 135
OD_CYLINDER: -0.50
OD_SPHERE: -0.50

## 2024-01-18 ASSESSMENT — VISUAL ACUITY
OS_SC: 20/20-2
OD_SC: 20/30
METHOD: SNELLEN - LINEAR

## 2024-01-18 ASSESSMENT — SLIT LAMP EXAM - LIDS
COMMENTS: NORMAL
COMMENTS: NORMAL

## 2024-01-18 ASSESSMENT — EXTERNAL EXAM - LEFT EYE: OS_EXAM: NORMAL

## 2024-01-18 NOTE — PROGRESS NOTES
Assessment/Plan   Diagnoses and all orders for this visit:  Primary open angle glaucoma (POAG) of both eyes, mild stage  -     latanoprost, PF, 0.005 % drops; Administer 1 drop into affected eye(s) once daily at bedtime.    Combined form of age-related cataract, both eyes  -continue with Latanoprost both eyes at bedtime    Blurred vision  -pt got SV reading glasses only but is having problems with computer work and reading;  would like to have new glasses for progressive lenses    Myopia, bilateral  Regular astigmatism of right eye  Presbyopia    Refractive error  -give Rx for new glasses  -pt was advised to build up her wearing time with glasses    Return in  3  month(s) for follow up or sooner if having any problems

## 2024-02-09 DIAGNOSIS — J02.9 SORE THROAT: ICD-10-CM

## 2024-02-09 DIAGNOSIS — J02.9 ACUTE SORE THROAT: ICD-10-CM

## 2024-02-10 ENCOUNTER — HOSPITAL ENCOUNTER (EMERGENCY)
Facility: HOSPITAL | Age: 74
Discharge: HOME | End: 2024-02-10
Attending: GENERAL PRACTICE
Payer: MEDICARE

## 2024-02-10 ENCOUNTER — APPOINTMENT (OUTPATIENT)
Dept: RADIOLOGY | Facility: HOSPITAL | Age: 74
End: 2024-02-10
Payer: MEDICARE

## 2024-02-10 VITALS
HEIGHT: 65 IN | HEART RATE: 71 BPM | WEIGHT: 215 LBS | RESPIRATION RATE: 18 BRPM | SYSTOLIC BLOOD PRESSURE: 137 MMHG | OXYGEN SATURATION: 100 % | TEMPERATURE: 98.1 F | DIASTOLIC BLOOD PRESSURE: 81 MMHG | BODY MASS INDEX: 35.82 KG/M2

## 2024-02-10 DIAGNOSIS — U07.1 COVID-19: Primary | ICD-10-CM

## 2024-02-10 LAB
FLUAV RNA RESP QL NAA+PROBE: NOT DETECTED
FLUBV RNA RESP QL NAA+PROBE: NOT DETECTED
RSV RNA RESP QL NAA+PROBE: NOT DETECTED
S PYO DNA THROAT QL NAA+PROBE: NOT DETECTED
SARS-COV-2 RNA RESP QL NAA+PROBE: DETECTED

## 2024-02-10 PROCEDURE — 87637 SARSCOV2&INF A&B&RSV AMP PRB: CPT | Performed by: GENERAL PRACTICE

## 2024-02-10 PROCEDURE — 87637 SARSCOV2&INF A&B&RSV AMP PRB: CPT | Performed by: EMERGENCY MEDICINE

## 2024-02-10 PROCEDURE — 71046 X-RAY EXAM CHEST 2 VIEWS: CPT

## 2024-02-10 PROCEDURE — 2500000004 HC RX 250 GENERAL PHARMACY W/ HCPCS (ALT 636 FOR OP/ED): Performed by: PHYSICIAN ASSISTANT

## 2024-02-10 PROCEDURE — 87651 STREP A DNA AMP PROBE: CPT | Performed by: PHYSICIAN ASSISTANT

## 2024-02-10 PROCEDURE — 71046 X-RAY EXAM CHEST 2 VIEWS: CPT | Performed by: RADIOLOGY

## 2024-02-10 PROCEDURE — 99283 EMERGENCY DEPT VISIT LOW MDM: CPT | Mod: 25 | Performed by: GENERAL PRACTICE

## 2024-02-10 RX ORDER — DEXAMETHASONE 6 MG/1
6 TABLET ORAL ONCE
Status: COMPLETED | OUTPATIENT
Start: 2024-02-10 | End: 2024-02-10

## 2024-02-10 RX ADMIN — DEXAMETHASONE 6 MG: 6 TABLET ORAL at 15:30

## 2024-02-10 ASSESSMENT — PAIN SCALES - GENERAL: PAINLEVEL_OUTOF10: 3

## 2024-02-10 ASSESSMENT — PAIN - FUNCTIONAL ASSESSMENT: PAIN_FUNCTIONAL_ASSESSMENT: 0-10

## 2024-02-10 NOTE — ED PROVIDER NOTES
HPI     CC: Flu Symptoms (Pt presents to ED for flu like sx and cough, sore throat. Denies CP/SOB, abd pain, N/V/D. )     HPI: Christine Fong is a 73 y.o. female with past medical history of hypertension and hyperlipidemia presents with concern for generalized unwell feeling.  Patient reports that since Monday, she has been fighting an illness.  Her symptoms include a sore throat/burning, eye tearing, nasal congestion, and nonproductive cough.  She states that her symptoms often are worse at night or when she first wakes up in the morning.  She has been eating and drinking throughout this time and does not feel weak or dehydrated.  She has tried [place] and Tylenol with some relief.  She denies fevers or chills.    ROS: 10-point review of systems was performed and is otherwise negative except as noted in HPI.      Past Medical History: Noncontributory except per HPI     Past Surgical History: Noncontributory except per HPI     Family History: Reviewed and noncontributory     Social History:  Denies tobacco. Denies ETOH. Denies illicit drugs.      Allergies   Allergen Reactions    Bacitracin Itching and Rash       Home Meds:   Current Outpatient Medications   Medication Instructions    chlorthalidone (HYGROTON) 25 mg, oral, Daily    cholecalciferol (VITAMIN D-3) 25 mcg, oral, Daily    ergocalciferol (VITAMIN D-2) 50,000 Units, oral, Weekly    flaxseed oiL (OMEGA-3 FLAXSEED OIL) 1,000 mg, oral, 3 times daily before meals    FLAXSEED OIL ORAL 1 capsule, oral, 3 times daily    latanoprost, PF, 0.005 % drops 1 drop, ophthalmic (eye), Nightly    losartan (COZAAR) 100 mg, oral, Daily    sodium chloride ([place]cean) 0.65 % nasal spray 1 spray, Each Nostril, As needed        ED Triage Vitals [02/10/24 1306]   Temperature Heart Rate Respirations BP   36.7 °C (98.1 °F) 71 18 137/81      Pulse Ox Temp Source Heart Rate Source Patient Position   100 % Oral Monitor --      BP Location FiO2 (%)     -- --         Heart Rate:  [71]  "  Temperature:  [36.7 °C (98.1 °F)]   Respirations:  [18]   BP: (137)/(81)   Height:  [165.1 cm (5' 5\")]   Weight:  [97.5 kg (215 lb)]   Pulse Ox:  [100 %]      Physical Exam:  Physical Exam  Constitutional:       General: She is not in acute distress.     Appearance: Normal appearance. She is not ill-appearing or toxic-appearing.   HENT:      Head: Normocephalic and atraumatic.      Right Ear: Tympanic membrane and external ear normal.      Left Ear: Tympanic membrane and external ear normal.      Nose: Congestion and rhinorrhea present.      Mouth/Throat:      Mouth: Mucous membranes are moist.      Comments: Exudate over uvula without significant swelling.  No tonsillar swelling or exudates.  Eyes:      Extraocular Movements: Extraocular movements intact.      Conjunctiva/sclera: Conjunctivae normal.      Pupils: Pupils are equal, round, and reactive to light.   Cardiovascular:      Rate and Rhythm: Normal rate and regular rhythm.      Pulses: Normal pulses.   Pulmonary:      Effort: Pulmonary effort is normal. No respiratory distress.      Breath sounds: Normal breath sounds. No wheezing.   Abdominal:      General: Abdomen is flat.      Palpations: Abdomen is soft.      Tenderness: There is no abdominal tenderness.   Musculoskeletal:         General: Normal range of motion.      Cervical back: Normal range of motion and neck supple.   Skin:     General: Skin is warm and dry.      Capillary Refill: Capillary refill takes less than 2 seconds.   Neurological:      General: No focal deficit present.      Mental Status: She is alert and oriented to person, place, and time.   Psychiatric:         Mood and Affect: Mood normal.          Diagnostic Results        Labs Reviewed   SARS-COV-2 AND INFLUENZA A/B PCR - Abnormal       Result Value    Flu A Result Not Detected      Flu B Result Not Detected      Coronavirus 2019, PCR Detected (*)     Narrative:     This assay has received FDA Emergency Use Authorization (EUA) " and  is only authorized for the duration of time that circumstances exist to justify the authorization of the emergency use of in vitro diagnostic tests for the detection of SARS-CoV-2 virus and/or diagnosis of COVID-19 infection under section 564(b)(1) of the Act, 21 U.S.C. 360bbb-3(b)(1). Testing for SARS-CoV-2 is only recommended for patients who meet current clinical and/or epidemiological criteria as defined by federal, state, or local public health directives. This assay is an in vitro diagnostic nucleic acid amplification test for the qualitative detection of SARS-CoV-2, Influenza A, and Influenza B from nasopharyngeal specimens and has been validated for use at [facility]. Negative results do not preclude COVID-19 infections or Influenza A/B infections, and should not be used as the sole basis for diagnosis, treatment, or other management decisions. If Influenza A/B and RSV PCR results are negative, testing for Parainfluenza virus, Adenovirus and Metapneumovirus is routinely performed for [facility] pediatric oncology and intensive care inpatients, and is available on other patients by placing an add-on request.    GROUP A STREPTOCOCCUS, PCR - Normal    Group A Strep PCR Not Detected     RSV PCR - Normal    RSV PCR Not Detected      Narrative:     This assay is an FDA-cleared, in vitro diagnostic nucleic acid amplification test for the detection of RSV from nasopharyngeal specimens, and has been validated for use at [facility]. Negative results do not preclude RSV infections, and should not be used as the sole basis for diagnosis, treatment, or other management decisions. If Influenza A/B and RSV PCR results are negative, testing for Parainfluenza virus, Adenovirus and Metapneumovirus is routinely performed for pediatric oncology and intensive care inpatients at [facility], and is available on other patients by placing an add-on request.             XR chest 2 views   Final  Result   No active cardiopulmonary disease.             MACRO:   None        Signed by: Audelia Hunter 2/10/2024 2:36 PM   Dictation workstation:   [id number]                    No data recorded                Procedure  Procedures    ED Course & MDM   Assessment/Plan:     Medications   dexAMETHasone (Decadron) tablet 6 mg (has no administration in time range)        Diagnoses as of 02/10/24 1529   COVID-19       Medical Decision Making    Christine Fong is a 73 y.o. female with past medical history of hypertension and hyperlipidemia who follows regularly with a primary care physician presents for flu like symptoms since Monday (5 days ago).  The patient is nontoxic-appearing and vital signs are normal.  She appears well-hydrated. Based on presentation, differential diagnosis includes flu, COVID, acute sinusitis, bronchitis, pneumonia, strep pharyngitis, RSV, or other viral infection.  Will obtain swabs for the above.  Swabs were positive for Covid.  Will also obtain 2 view chest x-ray to rule out pneumonia.  This was negative for pneumonia.  Strep swab was negative.  After the attending saw the patient, he also recommended 1 dose of Decadron for her pharyngitis.  This was given and patient tolerated well.  Vital signs are normal.  Given that she has no signs of dehydration, chest pain, shortness of breath, or any other secondary symptoms of bacterial infection, feel that she is appropriate for discharge at this time.    COVID: Educated on the test results.  We discussed that this is a viral illness that will likely resolve within 7 to 10 days.  Given that she is already between days 5 and 6, she is likely passed the peak of her illness.  We discussed that it is important to stay hydrated and maintain nutrition during this time to prevent dehydration.  She may take Tylenol for symptom control, but the steroids will likely also help.  Only given 1 dose in the emergency department.  Patient states that she was told at  some point to not take Motrin, so I did advise discussing this with her primary care physician as this would greatly help with her sore throat.  For her congestion, I have sent Camas nasal mist to her pharmacy to help.  We discussed that a secondary bacterial infection could occur and symptoms could include productive cough, new fevers or chills, generalized malaise, or worsening of current symptoms.  If this should occur, recommended returning to the emergency department.  Gave strict return precautions including but not limited to chest pain, shortness of breath, new fever, no chills, nausea, vomiting, or signs of dehydration.  They were agreeable to this plan of care and felt comfortable returning home.     Disposition: Home    ED Prescriptions       Medication Sig Dispense Start Date End Date Auth. Provider    sodium chloride ([place]cean) 0.65 % nasal spray Administer 1 spray into each nostril if needed for congestion. 30 mL 2/10/2024 2/9/2025 Brooke Potter PA-C            Social Determinants Affecting Care: None    Brooke Potter PA-C    This note was dictated by speech recognition. Minor errors in transcription may be present.     Brooke Potter PA-C  02/10/24 1798

## 2024-02-16 RX ORDER — AZITHROMYCIN 250 MG/1
TABLET, FILM COATED ORAL
Qty: 6 TABLET | Refills: 0 | Status: SHIPPED | OUTPATIENT
Start: 2024-02-16 | End: 2024-02-20

## 2024-04-10 DIAGNOSIS — H40.1131 CHRONIC OPEN ANGLE GLAUCOMA OF BOTH EYES, MILD STAGE: Primary | ICD-10-CM

## 2024-04-10 RX ORDER — LATANOPROST 50 UG/ML
1 SOLUTION/ DROPS OPHTHALMIC NIGHTLY
Qty: 2.5 ML | Refills: 3 | Status: SHIPPED | OUTPATIENT
Start: 2024-04-10 | End: 2024-07-09

## 2024-04-18 ENCOUNTER — APPOINTMENT (OUTPATIENT)
Dept: OPHTHALMOLOGY | Facility: CLINIC | Age: 74
End: 2024-04-18
Payer: MEDICARE

## 2024-04-22 ENCOUNTER — CLINICAL SUPPORT (OUTPATIENT)
Dept: PRIMARY CARE | Facility: CLINIC | Age: 74
End: 2024-04-22
Payer: MEDICARE

## 2024-04-22 DIAGNOSIS — E78.00 ELEVATED LDL CHOLESTEROL LEVEL: ICD-10-CM

## 2024-04-22 DIAGNOSIS — E03.9 HYPOTHYROIDISM, UNSPECIFIED TYPE: ICD-10-CM

## 2024-04-22 DIAGNOSIS — E87.6 HYPOKALEMIA: ICD-10-CM

## 2024-04-22 DIAGNOSIS — E78.5 HYPERLIPIDEMIA, UNSPECIFIED HYPERLIPIDEMIA TYPE: ICD-10-CM

## 2024-04-22 DIAGNOSIS — I10 PRIMARY HYPERTENSION: ICD-10-CM

## 2024-04-22 DIAGNOSIS — D50.9 IRON DEFICIENCY ANEMIA, UNSPECIFIED IRON DEFICIENCY ANEMIA TYPE: ICD-10-CM

## 2024-04-22 DIAGNOSIS — I10 BENIGN ESSENTIAL HYPERTENSION: ICD-10-CM

## 2024-04-22 LAB
ERYTHROCYTE [DISTWIDTH] IN BLOOD BY AUTOMATED COUNT: 13.5 % (ref 11.5–14.5)
HCT VFR BLD AUTO: 38.4 % (ref 36–46)
HGB BLD-MCNC: 11.8 G/DL (ref 12–16)
MCH RBC QN AUTO: 30.3 PG (ref 26–34)
MCHC RBC AUTO-ENTMCNC: 30.7 G/DL (ref 32–36)
MCV RBC AUTO: 99 FL (ref 80–100)
NRBC BLD-RTO: 0 /100 WBCS (ref 0–0)
PLATELET # BLD AUTO: 278 X10*3/UL (ref 150–450)
RBC # BLD AUTO: 3.89 X10*6/UL (ref 4–5.2)
WBC # BLD AUTO: 4.5 X10*3/UL (ref 4.4–11.3)

## 2024-04-22 PROCEDURE — 80053 COMPREHEN METABOLIC PANEL: CPT

## 2024-04-22 PROCEDURE — 36415 COLL VENOUS BLD VENIPUNCTURE: CPT

## 2024-04-22 PROCEDURE — 85027 COMPLETE CBC AUTOMATED: CPT

## 2024-04-22 PROCEDURE — 80061 LIPID PANEL: CPT

## 2024-04-23 LAB
ALBUMIN SERPL BCP-MCNC: 3.7 G/DL (ref 3.4–5)
ALP SERPL-CCNC: 78 U/L (ref 33–136)
ALT SERPL W P-5'-P-CCNC: 17 U/L (ref 7–45)
ANION GAP SERPL CALC-SCNC: 16 MMOL/L (ref 10–20)
AST SERPL W P-5'-P-CCNC: 15 U/L (ref 9–39)
BILIRUB SERPL-MCNC: 0.6 MG/DL (ref 0–1.2)
BUN SERPL-MCNC: 12 MG/DL (ref 6–23)
CALCIUM SERPL-MCNC: 9.4 MG/DL (ref 8.6–10.6)
CHLORIDE SERPL-SCNC: 100 MMOL/L (ref 98–107)
CHOLEST SERPL-MCNC: 195 MG/DL (ref 0–199)
CHOLESTEROL/HDL RATIO: 3.5
CO2 SERPL-SCNC: 27 MMOL/L (ref 21–32)
CREAT SERPL-MCNC: 0.83 MG/DL (ref 0.5–1.05)
EGFRCR SERPLBLD CKD-EPI 2021: 75 ML/MIN/1.73M*2
GLUCOSE SERPL-MCNC: 76 MG/DL (ref 74–99)
HDLC SERPL-MCNC: 55.5 MG/DL
LDLC SERPL CALC-MCNC: 113 MG/DL
NON HDL CHOLESTEROL: 140 MG/DL (ref 0–149)
POTASSIUM SERPL-SCNC: 3.3 MMOL/L (ref 3.5–5.3)
PROT SERPL-MCNC: 6.8 G/DL (ref 6.4–8.2)
SODIUM SERPL-SCNC: 140 MMOL/L (ref 136–145)
TRIGL SERPL-MCNC: 132 MG/DL (ref 0–149)
VLDL: 26 MG/DL (ref 0–40)

## 2024-04-24 DIAGNOSIS — M80.00XA OSTEOPOROSIS WITH CURRENT PATHOLOGICAL FRACTURE, UNSPECIFIED OSTEOPOROSIS TYPE, INITIAL ENCOUNTER: Primary | ICD-10-CM

## 2024-04-24 DIAGNOSIS — M81.0 OP (OSTEOPOROSIS): ICD-10-CM

## 2024-04-25 ENCOUNTER — OFFICE VISIT (OUTPATIENT)
Dept: PRIMARY CARE | Facility: CLINIC | Age: 74
End: 2024-04-25
Payer: MEDICARE

## 2024-04-25 VITALS
OXYGEN SATURATION: 96 % | RESPIRATION RATE: 12 BRPM | HEART RATE: 83 BPM | BODY MASS INDEX: 35.11 KG/M2 | WEIGHT: 211 LBS | DIASTOLIC BLOOD PRESSURE: 64 MMHG | SYSTOLIC BLOOD PRESSURE: 98 MMHG

## 2024-04-25 DIAGNOSIS — M21.6X9 ACQUIRED EQUINUS DEFORMITY OF FOOT, UNSPECIFIED LATERALITY: ICD-10-CM

## 2024-04-25 DIAGNOSIS — Z13.820 OSTEOPOROSIS SCREENING: ICD-10-CM

## 2024-04-25 DIAGNOSIS — N64.4 BREAST TENDERNESS IN FEMALE: ICD-10-CM

## 2024-04-25 DIAGNOSIS — M47.812 SPONDYLOSIS OF CERVICAL REGION WITHOUT MYELOPATHY OR RADICULOPATHY: ICD-10-CM

## 2024-04-25 DIAGNOSIS — M25.559 HIP PAIN, UNSPECIFIED LATERALITY: ICD-10-CM

## 2024-04-25 DIAGNOSIS — M17.9 OSTEOARTHRITIS OF KNEE, UNSPECIFIED LATERALITY, UNSPECIFIED OSTEOARTHRITIS TYPE: ICD-10-CM

## 2024-04-25 DIAGNOSIS — M51.36 DEGENERATION OF LUMBAR INTERVERTEBRAL DISC: ICD-10-CM

## 2024-04-25 DIAGNOSIS — E78.5 HYPERLIPIDEMIA, UNSPECIFIED HYPERLIPIDEMIA TYPE: ICD-10-CM

## 2024-04-25 DIAGNOSIS — M81.8 OTHER OSTEOPOROSIS WITHOUT CURRENT PATHOLOGICAL FRACTURE: ICD-10-CM

## 2024-04-25 DIAGNOSIS — I15.9 SECONDARY HYPERTENSION: Primary | ICD-10-CM

## 2024-04-25 PROBLEM — I10 MALIGNANT HYPERTENSION: Status: ACTIVE | Noted: 2023-05-23

## 2024-04-25 PROBLEM — Z86.39 HISTORY OF ELEVATED LIPIDS: Status: ACTIVE | Noted: 2024-04-25

## 2024-04-25 PROBLEM — H61.20 IMPACTED CERUMEN: Status: ACTIVE | Noted: 2024-04-25

## 2024-04-25 PROCEDURE — 1159F MED LIST DOCD IN RCRD: CPT | Performed by: INTERNAL MEDICINE

## 2024-04-25 PROCEDURE — 99214 OFFICE O/P EST MOD 30 MIN: CPT | Performed by: INTERNAL MEDICINE

## 2024-04-25 PROCEDURE — 3078F DIAST BP <80 MM HG: CPT | Performed by: INTERNAL MEDICINE

## 2024-04-25 PROCEDURE — 3074F SYST BP LT 130 MM HG: CPT | Performed by: INTERNAL MEDICINE

## 2024-04-25 PROCEDURE — 1036F TOBACCO NON-USER: CPT | Performed by: INTERNAL MEDICINE

## 2024-04-25 NOTE — PROGRESS NOTES
Subjective   Chief complaint: Christine Fong is a 73 y.o. female who presents for Follow-up (Pt is here for blood work follow up. Pt c/o feeling full all the time. ).    HPI:  73 y.o. female reports to the office for follow up of general wellness. Pt has been feeling well lately although complains of reflux symptoms. She has had early satiety. Feeling otherwise well.           Objective   BP 98/64   Pulse 83   Resp 12   Wt 95.7 kg (211 lb)   SpO2 96%   BMI 35.11 kg/m²   Physical Exam  Constitutional:       Appearance: Normal appearance.   HENT:      Head: Normocephalic and atraumatic.      Mouth/Throat:      Mouth: Mucous membranes are moist.      Pharynx: Oropharynx is clear.   Eyes:      Pupils: Pupils are equal, round, and reactive to light.   Cardiovascular:      Rate and Rhythm: Normal rate and regular rhythm.   Pulmonary:      Effort: Pulmonary effort is normal. No respiratory distress.   Abdominal:      General: Abdomen is flat. There is no distension.   Musculoskeletal:         General: Normal range of motion.      Cervical back: Normal range of motion.   Skin:     General: Skin is warm and dry.   Neurological:      General: No focal deficit present.      Mental Status: She is alert.         I have reviewed and reconciled the medication list with the patient today.   Current Outpatient Medications:     chlorthalidone (Hygroton) 25 mg tablet, Take 1 tablet (25 mg) by mouth once daily., Disp: 90 tablet, Rfl: 3    cholecalciferol (Vitamin D-3) 25 MCG (1000 UT) tablet, Take 1 tablet (25 mcg) by mouth once daily., Disp: 90 tablet, Rfl: 3    flaxseed oiL (Omega-3 flaxseed oiL) 1,000 mg capsule, Take 1 capsule (1,000 mg) by mouth 3 times a day before meals., Disp: 90 capsule, Rfl: 3    latanoprost (Xalatan) 0.005 % ophthalmic solution, Administer 1 drop into both eyes once daily at bedtime., Disp: 2.5 mL, Rfl: 3    losartan (Cozaar) 100 mg tablet, Take 1 tablet (100 mg) by mouth once daily., Disp: 90 tablet,  Rfl: 3    sodium chloride (Ocean) 0.65 % nasal spray, Administer 1 spray into each nostril if needed for congestion., Disp: 30 mL, Rfl: 0     Imaging:  No results found.     Labs reviewed:    Lab Results   Component Value Date    WBC 4.5 04/22/2024    HGB 11.8 (L) 04/22/2024    HCT 38.4 04/22/2024     04/22/2024    CHOL 195 04/22/2024    TRIG 132 04/22/2024    HDL 55.5 04/22/2024    ALT 17 04/22/2024    AST 15 04/22/2024     04/22/2024    K 3.3 (L) 04/22/2024     04/22/2024    CREATININE 0.83 04/22/2024    BUN 12 04/22/2024    CO2 27 04/22/2024    TSH 1.88 12/14/2023    HGBA1C 5.2 12/14/2023       Assessment/Plan   Problem List Items Addressed This Visit          Cardiac and Vasculature    Hyperlipidemia     Mild elevation to   Take two flax seed oil tablets per day          Hypertension - Primary     Well controlled   98/64  Continue current therapy             Musculoskeletal and Injuries    Acquired equinus deformity of foot    Hip pain    Relevant Orders    XR DEXA bone density    Osteoarthrosis    Relevant Orders    XR DEXA bone density    XR DEXA bone density       Neuro    Degeneration of lumbar intervertebral disc    Spondylosis of cervical region without myelopathy or radiculopathy    Relevant Orders    XR DEXA bone density     Other Visit Diagnoses       Osteoporosis screening        Relevant Orders    XR DEXA bone density    Breast tenderness in female        Relevant Orders    Referral to Gynecology    Other osteoporosis without current pathological fracture                Continue current medications as listed  Follow up in 3 months       Complex Repair Preamble Text (Leave Blank If You Do Not Want): Extensive wide undermining was performed.

## 2024-05-08 ENCOUNTER — APPOINTMENT (OUTPATIENT)
Dept: RADIOLOGY | Facility: CLINIC | Age: 74
End: 2024-05-08
Payer: MEDICARE

## 2024-05-09 ENCOUNTER — HOSPITAL ENCOUNTER (OUTPATIENT)
Dept: RADIOLOGY | Facility: CLINIC | Age: 74
Discharge: HOME | End: 2024-05-09
Payer: MEDICARE

## 2024-05-09 DIAGNOSIS — M81.0 OP (OSTEOPOROSIS): ICD-10-CM

## 2024-05-09 DIAGNOSIS — M80.00XA OSTEOPOROSIS WITH CURRENT PATHOLOGICAL FRACTURE, UNSPECIFIED OSTEOPOROSIS TYPE, INITIAL ENCOUNTER: ICD-10-CM

## 2024-05-09 PROCEDURE — 77080 DXA BONE DENSITY AXIAL: CPT

## 2024-05-09 PROCEDURE — 77080 DXA BONE DENSITY AXIAL: CPT | Performed by: RADIOLOGY

## 2024-05-10 ENCOUNTER — OFFICE VISIT (OUTPATIENT)
Dept: OBSTETRICS AND GYNECOLOGY | Facility: CLINIC | Age: 74
End: 2024-05-10
Payer: MEDICARE

## 2024-05-10 VITALS
BODY MASS INDEX: 35.12 KG/M2 | HEIGHT: 65 IN | SYSTOLIC BLOOD PRESSURE: 122 MMHG | WEIGHT: 210.8 LBS | DIASTOLIC BLOOD PRESSURE: 68 MMHG

## 2024-05-10 DIAGNOSIS — N64.4 BREAST TENDERNESS IN FEMALE: ICD-10-CM

## 2024-05-10 PROCEDURE — 3078F DIAST BP <80 MM HG: CPT | Performed by: ADVANCED PRACTICE MIDWIFE

## 2024-05-10 PROCEDURE — 1159F MED LIST DOCD IN RCRD: CPT | Performed by: ADVANCED PRACTICE MIDWIFE

## 2024-05-10 PROCEDURE — 1126F AMNT PAIN NOTED NONE PRSNT: CPT | Performed by: ADVANCED PRACTICE MIDWIFE

## 2024-05-10 PROCEDURE — 1036F TOBACCO NON-USER: CPT | Performed by: ADVANCED PRACTICE MIDWIFE

## 2024-05-10 PROCEDURE — 99203 OFFICE O/P NEW LOW 30 MIN: CPT | Performed by: ADVANCED PRACTICE MIDWIFE

## 2024-05-10 PROCEDURE — 3074F SYST BP LT 130 MM HG: CPT | Performed by: ADVANCED PRACTICE MIDWIFE

## 2024-05-10 ASSESSMENT — ENCOUNTER SYMPTOMS
GASTROINTESTINAL NEGATIVE: 0
CARDIOVASCULAR NEGATIVE: 0
NEUROLOGICAL NEGATIVE: 0
ALLERGIC/IMMUNOLOGIC NEGATIVE: 0
PSYCHIATRIC NEGATIVE: 0
ENDOCRINE NEGATIVE: 0
EYES NEGATIVE: 0
MUSCULOSKELETAL NEGATIVE: 0
HEMATOLOGIC/LYMPHATIC NEGATIVE: 0
RESPIRATORY NEGATIVE: 0
CONSTITUTIONAL NEGATIVE: 0

## 2024-05-10 ASSESSMENT — PAIN SCALES - GENERAL: PAINLEVEL: 0-NO PAIN

## 2024-05-10 NOTE — PROGRESS NOTES
Subjective   Patient ID: Christine Fong is a 73 y.o. female who presents for Breast Problem.  HPI  Patient c/o left breast pain does have a history of breast cancer. Has been symptomatic for one month. Denies fever or chills. Denies nipple discharge. Or skin changes. Has been treated for breast cancer in the past.       Objective   Physical Exam  Large left breast mass palpated in all four quandrants. Very tender to palpation. Large area of retraction noted, along with skin changes. Likely d/t previous treatment with radiation. No nipple discharge.    Assessment/Plan   Problem List Items Addressed This Visit    None  Visit Diagnoses         Codes    Breast tenderness in female     N64.4    Relevant Orders    BI mammo left diagnostic tomosynthesis    BI US breast limited left          Diagnostic mammogram and breast ultrasound ordered stat.   Patient to follow up with breast center PRN.      RTO for annual   DERRICK Becerril-DAVE 05/10/24 1:50 PM

## 2024-05-14 ENCOUNTER — HOSPITAL ENCOUNTER (OUTPATIENT)
Dept: RADIOLOGY | Facility: CLINIC | Age: 74
Discharge: HOME | End: 2024-05-14
Payer: MEDICARE

## 2024-05-14 VITALS — WEIGHT: 210.76 LBS | BODY MASS INDEX: 35.11 KG/M2 | HEIGHT: 65 IN

## 2024-05-14 DIAGNOSIS — N64.4 BREAST TENDERNESS IN FEMALE: ICD-10-CM

## 2024-05-14 PROCEDURE — G0279 TOMOSYNTHESIS, MAMMO: HCPCS | Performed by: RADIOLOGY

## 2024-05-14 PROCEDURE — 76642 ULTRASOUND BREAST LIMITED: CPT | Performed by: RADIOLOGY

## 2024-05-14 PROCEDURE — 76642 ULTRASOUND BREAST LIMITED: CPT | Mod: LT

## 2024-05-14 PROCEDURE — 77062 BREAST TOMOSYNTHESIS BI: CPT

## 2024-05-14 PROCEDURE — 77066 DX MAMMO INCL CAD BI: CPT | Performed by: RADIOLOGY

## 2024-07-22 ENCOUNTER — APPOINTMENT (OUTPATIENT)
Dept: PRIMARY CARE | Facility: CLINIC | Age: 74
End: 2024-07-22
Payer: MEDICARE

## 2024-07-26 ENCOUNTER — APPOINTMENT (OUTPATIENT)
Dept: PRIMARY CARE | Facility: CLINIC | Age: 74
End: 2024-07-26
Payer: MEDICARE

## 2024-07-29 ENCOUNTER — APPOINTMENT (OUTPATIENT)
Dept: PRIMARY CARE | Facility: CLINIC | Age: 74
End: 2024-07-29
Payer: MEDICARE

## 2024-08-01 DIAGNOSIS — I10 HYPERTENSION, UNSPECIFIED TYPE: ICD-10-CM

## 2024-08-01 RX ORDER — CHLORTHALIDONE 25 MG/1
25 TABLET ORAL DAILY
Qty: 90 TABLET | Refills: 3 | Status: SHIPPED | OUTPATIENT
Start: 2024-08-01

## 2024-08-15 ENCOUNTER — APPOINTMENT (OUTPATIENT)
Dept: OPHTHALMOLOGY | Facility: CLINIC | Age: 74
End: 2024-08-15
Payer: MEDICARE

## 2024-08-15 DIAGNOSIS — H40.1131 PRIMARY OPEN ANGLE GLAUCOMA (POAG) OF BOTH EYES, MILD STAGE: Primary | ICD-10-CM

## 2024-08-15 DIAGNOSIS — H25.813 COMBINED FORM OF AGE-RELATED CATARACT, BOTH EYES: ICD-10-CM

## 2024-08-15 DIAGNOSIS — H04.123 DRY EYES: ICD-10-CM

## 2024-08-15 DIAGNOSIS — H53.8 BLURRED VISION: ICD-10-CM

## 2024-08-15 PROCEDURE — 92012 INTRM OPH EXAM EST PATIENT: CPT | Performed by: OPHTHALMOLOGY

## 2024-08-15 RX ORDER — BRIMONIDINE TARTRATE 2 MG/ML
1 SOLUTION/ DROPS OPHTHALMIC 2 TIMES DAILY
Qty: 5 ML | Refills: 5 | Status: SHIPPED | OUTPATIENT
Start: 2024-08-15 | End: 2024-09-14

## 2024-08-15 ASSESSMENT — VISUAL ACUITY
METHOD: SNELLEN - LINEAR
OD_CC: 20/20
OS_CC: 20/20
OD_CC+: -2

## 2024-08-15 ASSESSMENT — SLIT LAMP EXAM - LIDS
COMMENTS: NORMAL
COMMENTS: NORMAL

## 2024-08-15 ASSESSMENT — ENCOUNTER SYMPTOMS: EYES NEGATIVE: 1

## 2024-08-15 ASSESSMENT — PACHYMETRY
OS_CT(UM): 521
OD_CT(UM): 533

## 2024-08-15 ASSESSMENT — TONOMETRY
OS_IOP_MMHG: 21
IOP_METHOD: GOLDMANN APPLANATION
OD_IOP_MMHG: 18

## 2024-08-15 ASSESSMENT — EXTERNAL EXAM - LEFT EYE: OS_EXAM: NORMAL

## 2024-08-15 ASSESSMENT — EXTERNAL EXAM - RIGHT EYE: OD_EXAM: NORMAL

## 2024-08-15 NOTE — PROGRESS NOTES
Assessment/Plan   Diagnoses and all orders for this visit:  Primary open angle glaucoma (POAG) of both eyes, mild stage  -intraocular pressure (IOP) elevated today- has been off of eyedrops for a while due to redness and irritation  -     brimonidine (AlphaGAN) 0.2 % ophthalmic solution; Administer 1 drop into both eyes 2 times a day.  Stop Latanoprost-pt is allergic to it  -start Alphagan 0.2% both eyes bid    Combined form of age-related cataract, both eyes  Not visually significant at the present time  continue to monitor    Dry eyes  -Start artificial tears both eyes (OU) qid  -stress compliance    Blurred vision  -pt has good vision today on exam  -advised pt to get smaller frames next time    Return in   2 month(s) for follow up or sooner if having any problems      Return in  2  month(s) for follow up or sooner if having any problems

## 2024-08-21 ENCOUNTER — CLINICAL SUPPORT (OUTPATIENT)
Dept: AUDIOLOGY | Facility: CLINIC | Age: 74
End: 2024-08-21
Payer: MEDICARE

## 2024-08-21 ENCOUNTER — OFFICE VISIT (OUTPATIENT)
Dept: OTOLARYNGOLOGY | Facility: CLINIC | Age: 74
End: 2024-08-21
Payer: MEDICARE

## 2024-08-21 VITALS — BODY MASS INDEX: 35.17 KG/M2 | HEIGHT: 65 IN | WEIGHT: 211.1 LBS | TEMPERATURE: 96.6 F

## 2024-08-21 DIAGNOSIS — H90.3 SENSORINEURAL HEARING LOSS (SNHL) OF BOTH EARS: Primary | ICD-10-CM

## 2024-08-21 DIAGNOSIS — H61.23 BILATERAL IMPACTED CERUMEN: ICD-10-CM

## 2024-08-21 DIAGNOSIS — R42 DIZZINESS AND GIDDINESS: Primary | ICD-10-CM

## 2024-08-21 DIAGNOSIS — H93.12 TINNITUS OF LEFT EAR: ICD-10-CM

## 2024-08-21 DIAGNOSIS — H90.3 SENSORINEURAL HEARING LOSS (SNHL) OF BOTH EARS: ICD-10-CM

## 2024-08-21 PROCEDURE — 1160F RVW MEDS BY RX/DR IN RCRD: CPT | Performed by: NURSE PRACTITIONER

## 2024-08-21 PROCEDURE — 69210 REMOVE IMPACTED EAR WAX UNI: CPT | Performed by: NURSE PRACTITIONER

## 2024-08-21 PROCEDURE — 3008F BODY MASS INDEX DOCD: CPT | Performed by: NURSE PRACTITIONER

## 2024-08-21 PROCEDURE — 1159F MED LIST DOCD IN RCRD: CPT | Performed by: NURSE PRACTITIONER

## 2024-08-21 PROCEDURE — 1036F TOBACCO NON-USER: CPT | Performed by: NURSE PRACTITIONER

## 2024-08-21 PROCEDURE — 92557 COMPREHENSIVE HEARING TEST: CPT

## 2024-08-21 PROCEDURE — 92550 TYMPANOMETRY & REFLEX THRESH: CPT

## 2024-08-21 PROCEDURE — 99204 OFFICE O/P NEW MOD 45 MIN: CPT | Performed by: NURSE PRACTITIONER

## 2024-08-21 ASSESSMENT — PATIENT HEALTH QUESTIONNAIRE - PHQ9
1. LITTLE INTEREST OR PLEASURE IN DOING THINGS: NOT AT ALL
2. FEELING DOWN, DEPRESSED OR HOPELESS: NOT AT ALL
SUM OF ALL RESPONSES TO PHQ9 QUESTIONS 1 AND 2: 0

## 2024-08-21 NOTE — PROGRESS NOTES
Subjective   Patient ID: Christine Fong is a 73 y.o. female who presents for ear static (vertigo).  HPI  Static noises in left ear started 2 months ago. It happened before but went away.  This noise is everyday and worsening and almost occurring constantly. No noise exposure or specific triggers. It miller snot wake her up from sleep. It is mostly absent in morning but as soon as she moves around it starts.     Dizziness intermittent all day. Had dizziness 3 months ago while at supermarket and fell but did not hit head. She does not take any medications for the dizziness. Admits to drinking about 24 ounces of water per day. She drinks about the same amount soda per day.  Normal sodium intake per day. Tries not to eat fast foods.  She drinks coffee everyday about 2 cups.    No light or sound sensitivities or headaches.  No autophony.     No prior ear surgery or ear trauma. No family history of ear disease. No drainage or ear pain. No head trauma.     AUDIO: Bilateral sensorineural hearing loss higher frequencies.  There is noted asymmetry right greater than left ear at 125 Hz and 1K Hz. Normal tympanogram bilateral. Absent reflexes left ipsilateral. Reflex present  right ipsilateral 500-2K Hz absent at 4 K Hz.           Review of Systems  All other systems have been reviewed and are negative for complaints except for those mentioned in history of present illness, past medical history and problem list       Objective   Physical Exam    CONSTITUTIONAL: No acute distress, normal facial features; No fever; no chills  VOICE: No hoarseness or other audible abnormality  RESPIRATION: Breathing comfortably, no stridor; normal breathing effort  CV: No cyanosis visible on the face and neck area  EYES:Pupils equal and round ; no erythema; conjunctiva clear; sclera white  NEURO: Alert and oriented, able to raise eyebrows symmetrical bilateral, smile with no facial droop, able to swallow  HEAD AND FACE: Symmetric facial features, no  masses or lesions    Right ear examination: External ear normal. EAC with impacted cerumen. TM not visualized.   Left ear examination: External ear normal. EAC with moderate cerumen. TM is intact.    NOSE: External nose midline; inferior nasal turbinates normal. No mucopus or polyps.   ORAL CAVITY: No lesions of external lips; uvula is midline; tongue with good mobility; no gross mass in oral cavity; mucosa appears pink   NECK/LYMPH: No obvious deformity or lesions; trachea is midline  PSYCH: Alert and oriented with appropriate mood and affect.      Patient ID: Christine Fong is a 73 y.o. female.    Procedures    Cerumen removal    Consent:  The planned procedure is discussed including possible risk, benefits and alternative treatments reviewed.  Verbal consent is obtained.    Indications:Obstructed cerumen is noted affecting hearing and causing discomfort.    Procedure: The ears are examined microscopically.  Using speculum, alligator, #7, #5 suction the obstructive cerumen in both the ears were removed.    Findings: Cerumen and epithelial debris obstruction in both external auditory canals.  Inspection of tympanic membrane after cleaning showed intact with no effusion, retraction or perforation.    Post procedure: The patient tolerated the procedure well without complications       Assessment/Plan       Problem List Items Addressed This Visit       Impacted cerumen     Other Visit Diagnoses       Dizziness and giddiness    -  Primary    Relevant Orders    Electronystagmography    Tinnitus of left ear        Sensorineural hearing loss (SNHL) of both ears             The cerumen was removed using microscope and instruments. Patient tolerated procedure well.  Physiology of balance explained. I discussed various causes of dizziness.  Undetermined at this time if truly peripheral versus central. I will work up with ENG  then review results. If positive for peripheral vestibular origin, plan for vestibular therapy and  imaging if necessary.  If negative for peripheral causes, plan for Neurology referral.   She is not a candidate for hearing aids at this time.              DERRICK Ledesma-MARYBETH 08/21/24 2:26 PM

## 2024-08-21 NOTE — PROGRESS NOTES
"ADULT AUDIOLOGY EVALUATION    Name:  Christine Fong  :  1950  Age:  73 y.o.  Date of Evaluation:  24    IMPRESSIONS     Today's test results indicate normal middle ear functioning bilaterally, with normal sloping to moderate sensorineural hearing loss in the right ear, and normal sloping to mild sensorineural hearing loss in the left ear. Word recognition is excellent in both ears.      RECOMMENDATIONS     Continue medical follow up with PCP and ENT. Patient is scheduled to see ENT directly following today's testing.  Patient was counseled in regard to findings. No hearing aid recommended at this time due to primarily mild and high frequency nature of hearing loss.   Return for annual hearing evaluation or sooner should concerns arise.    Time: 0418-6803    HISTORY     Christine Fong is seen today for an audiologic evaluation in conjunction with otolaryngology. Patient reports intermittent tinnitus in the left ear only described as \"static\", along with associated dizziness. She reports that the tinnitus started while working at [employer], which she was employed at from 8284-1104 and was exposed to loud machine noise with use of hearing protection. This was present for several months and eventually went away, but returned again for several months last year. Patient also started noticing mild dizziness associated with the tinnitus at this time, which she described as being off balance while standing. These symptoms started up again around 3-4 months ago, and patient reports the dizziness to be getting worse over time. Her dizziness lasts for seconds at a time and is alleviated by sitting down. Patient denied experiencing room spinning vertigo.    Christine has not noticed difficulty hearing. She reports that occasionally family members will tell her that her TV is loud. She denied ear pain, pressure and fullness, as well as history of otologic surgeries.      TEST RESULTS     Otoscopic Evaluation:  Right Ear: " Non-occluding cerumen in ear canal with partial visualization of tympanic membrane  Left Ear: Clear ear canal with unremarkable tympanic membrane    Tympanometry:   Right Ear: Normal, type A tympanogram with normal ear canal volume, peak pressure and compliance.   Left Ear: Normal, type A tympanogram with normal ear canal volume, peak pressure and compliance.     Ipsilateral Acoustic Reflexes:   Right Ear: Present 500-2000 Hz, absent 4000 Hz.   Left Ear: Absent 500-4000 Hz.    Pure Tone Audiometry (125-8000 Hz):     Right Ear: Borderline mild at 125 Hz, rising to normal hearing sensitivity from 250-500 Hz, sloping back to mild sensorineural hearing loss from 7954-7180 Hz, and moderate at 8000 Hz.    Left Ear: Normal hearing sensitivity from 125-2000 Hz, falling to mild sensorineural hearing loss from 1833-4891 Hz.    Speech Audiometry:   Right Ear:    Speech Reception Threshold (SRT) was obtained at 15 dBHL using monitored live voice (MLV).   Word Recognition scores were excellent (100%) in quiet when words were presented at 60 dBHL using recorded NU-6 word list ordered by difficulty.  Left Ear:    Speech Reception Threshold (SRT) was obtained at 15 dBHL using monitored live voice (MLV).   Word Recognition scores were excellent (100%) in quiet when words were presented at 60 dBHL using recorded NU-6 word list ordered by difficulty.         Corey Adan, CCC-A  Clinical Audiologist    Degree of Hearing Sensitivity Decibel Range   Within Normal Limits (WNL) 0-25   Mild 26-40   Moderate 41-55   Moderately-Severe 56-70   Severe 71-90   Profound 91+      Key   CNT/DNT Could Not Test/Did Not Test   TM Tympanic Membrane   WNL Within Normal Limits   HA Hearing Aid   SNHL Sensorineural Hearing Loss   CHL Conductive Hearing Loss   NIHL Noise-Induced Hearing Loss   ECV Ear Canal Volume   MLV Monitored Live Voice     AUDIOGRAM

## 2024-08-30 DIAGNOSIS — I10 HYPERTENSION, UNSPECIFIED TYPE: ICD-10-CM

## 2024-09-01 RX ORDER — LOSARTAN POTASSIUM 100 MG/1
100 TABLET ORAL DAILY
Qty: 90 TABLET | Refills: 3 | Status: SHIPPED | OUTPATIENT
Start: 2024-09-01

## 2024-09-20 NOTE — PROGRESS NOTES
ADULT BALANCE FUNCTION TEST (BFT)    Name:  Christine Fong  :  1950  Age:  73 y.o.  Date of Evaluation:  10/3/2024     IMPRESSIONS     Overall normal vestibular examination; no evidence of active vestibular system involvement to account for patient reported symptoms. It should be noted that patient had borderline reduced oVEMPs bilaterally, and absent cVEMPs bilaterally. Absent cVEMPs are thought to be largely influenced by poor contraction of the sternocleidomastoid muscle. Given no other abnormal findings, vestibular examination is considered to be largely normal. There were no indications central vestibular system pathway involvement. There were no indications of active Benign Paroxysmal Positional Vertigo (BPPV) present. Normal observation of gait and transfers.       RECOMMENDATIONS     Follow up with ZENAIDA Ledesma regarding today's findings.  Consider re-evaluation as medically indicated.  Maintain a healthy lifestyle to help body function overall.  Continue monitoring per ENT/PCP preference.    Time: 4422-9753    HISTORY     Patient was seen for Balance Function Testing (BFT) due to a history of dizziness/imbalance. Vestibular case history collected via patient-clinician interview, patient chart review, and patient questionnaires.    Patient reported history of dizziness described as being off balance, leaning to the right, and lightheadedness.  Patient began experiencing intermittent static tinnitus in the left ear only around last year. This recently returned over the last 2-3 months and has become more frequent, and is now heard intermittently throughout the day.   Off balance symptoms have begun to accompany the tinnitus over the last 2-3 months. Tinnitus and dizziness often accompany each other, but both can occur independently.  Episodes occur around 3x per day and last 10-15 seconds before subsiding. Tinnitus occurs off and on throughout the day, more frequently than  "dizziness.  Associated symptoms include intermittent tinnitus in the left ear only, and blurry vision,.  Symptoms occur spontaneously, patient could not identify any provocations.  Symptoms are alleviated by sitting down.  Overall the patient's symptoms have become more frequent over time.  This patient has not fallen due to their symptoms.   Denied any symptoms provoked by sneezing or coughing, as well as any presence of autophony.   Denied any recent medication changes.   Relevant medical history includes eye cataract and glaucoma.  Most recent audiologic evaluation performed on 8/21/24 revealed normal sloping to moderate sensorineural hearing loss in the right ear, and normal sloping to mild sensorineural hearing loss in the left ear. Tympanometry revealed normal eardrum mobility and canal volume, bilaterally.  Patient reported complying with pre-test instructions.       EVALUATION     See VNG, vHIT, & VEMP Raw Data in \"Media\"    TEST RESULTS     BEDSIDE ASSESSMENT TEST  The bedside assessment is an optional portion of the test battery to further assist in differential diagnosis and screen for eye abnormalities which may affect testing.    Otoscopy: clear ear canals.  Extra-Ocular Range of Motion: normal.Extra-Ocular Range of Motion is performed to evaluate any eye abnormalities prior to testing.  Cover/Uncover: normal. Cover/Uncover test is performed to evaluate for skewed deviation of the eyes prior to testing.  Cervical Neck Exam: normal. Cervical Neck is performed to evaluate any restrictions or pain with neck movement prior to testing.  Vertebral Artery Screen: normal. Vertebral Artery Screen is performed to evaluate for vertebrobasilar insufficiency prior to testing.   Ocular Counter-Roll: normal. Ocular Counter-Roll is performed to evaluate ocular tilt reaction and assess otolith organ function prior to testing.  VOR Cancellation: normal. VOR Cancellation is performed to evaluate fixation suppression prior " to testing.      VIDEONYSTAGMOGRAPHY (VNG) TEST  VNG provides objective indications of peripheral and central vestibulo-ocular pathway involvement. Ocular motor testing to visually guided targets is conducted using a dual channel video-recording technique for the recording of eye movement in the horizontal and vertical planes. Air caloric testing is performed at 48 degrees C and 24 degrees C.    Spontaneous Nystagmus test was absent. Spontaneous nystagmus testing may help with the identification of an acute or uncompensated peripheral vestibular lesion.   Gaze Nystagmus test was normal. Gaze nystagmus testing is to evaluate for nystagmus that is evoked by holding eye gaze in any particular direction. True gaze nystagmus is amplified when vision is denied.   Random Saccades test was normal. Random saccade testing is to evaluate patient's ability to make fast random eye movements along a horizontal moving target.   Smooth Pursuit/Tracking test was normal. Smooth pursuit/tracking testing is to evaluate the ability to move eyes with a single smoothly moving target.   Optokinetic nystagmus testing was normal. This full-field OPK test is to evaluate the ability of central nervous system to stabilize vision during sustained head movement after the VOR system loses effectiveness.   Christianne-Hallpike testing was normal. Christianne-Hallpike testing is to provide a diagnosis of Benign Paroxysmal Positional Vertigo (BPPV) of the vertical semicircular canals on the side which is most affected.  Roll testing was normal. Roll testing is to provide a diagnosis of Benign Paroxysmal Positional Vertigo (BPPV) of the horizontal semicircular canals on the side which is most affected.  Positional testing was normal. Positional testing is to evaluate patient's ability to hold a steady gaze while in different positions.  Bithermal caloric testing was normal. Unilateral weakness of 22% to the left which is normal and a directional preponderance of 4%  to the right which is normal.  Caloric testing is to evaluate for peripheral vestibular lesion.         VIDEO HEAD IMPULSE TEST (vHIT)  The vHIT procedure provides objective assessment of the high frequency vestibulo-ocular reflex (VOR) for each semicircular canal. Rapid, random horizontal and vertical thrusts are applied to the patient's head to provoke the VOR. The vHIT procedure includes two separate paradigms: Head Impulse Paradigm (HIMP) and Suppression Head Impulse Paradigm (SHIMP). SHIMP is an optional paradigm that is not appropriate to perform for every patient. However, it is appropriate to perform SHIMP when there is verified evidence of possible vestibulopathy in the traditional HIMP test.     Head Impulse Paradigm (HIMP)   Right Ear   Canal Gain Overt Saccades Covert Saccades   Lateral 1.71 absent absent   Anterior 1.37 absent absent   Posterior 1.26 absent absent        Head Impulse Paradigm (HIMP)   Left Ear   Canal Gain Overt Saccades Covert Saccades   Lateral 1.36 absent absent   Anterior 1.60 absent absent   Posterior 1.04 absent absent     Total gain for all canals tested was within normal limits  (<0.80 is abnormal for lateral, <0.70 is abnormal for vertical).  There was no evidence of overt or covert saccades throughout testing. Of note, high gain may be due to goggle slippage or calibration error.       CERVICAL VESTIBULAR EVOKED MYOGENIC POTENTIALS (cVEMP)  The cVEMP procedure is an evoked potential used to test the saccule and its afferent pathway. An asymmetry ratio is utilized to determine side of lesion. The cVEMP was recorded with the patient cervical extension to produce isolated contraction of the ipsilateral sternocleidomastoid (SCM) muscle. The cVEMP was recorded using a 500 Hz tone burst or 1000 Hz tone burst at a rate of 5.1.      Ear Presentation Level Amplitude P1 Latency N1 Latency  Amplitude Asymmetry Ratio   Right Absent at 110 dB nHL N/A  N/A N/A N/A due to absent responses  bilaterally   Left Absent at 110 dB nHL N/A  N/A N/A       Superior Canal Dehiscence Screening (75 dB nHL): Did not test due to absent responses at 95 dB in both ears.    Replicable cVEMP responses were absent in both ears. Of important note, absent responses are thought to be influenced by poor sternocleidomastoid contraction on both sides. The amplitude asymmetry ratio was unable to be calculated due to absent responses.        OCULAR VESTIBULAR EVOKED MYOGENIC POTENTIALS (oVEMP)  The oVEMP procedure is an evoked potential used to test the utricle and its afferent pathway. An asymmetry ratio is utilized to determine side of lesion. This is a contralateral recording. The oVEMP was recorded with the patient seated upright with eyes tilted upward to produce isolated contraction of the contralateral inferior oblique muscle. The oVEMP were recorded using a 500 Hz, 2000 Hz, 4000 Hz tone burst at a rate of 5.1.       Ear Presentation Level Amplitude N1 Latency  P1 Latency  Amplitude Asymmetry Ratio   Right 105 dB nHL 3.50 µV 11.67 ms 14.33 ms 23%   Left 105 dB nHL 5.53 µV 13.67 ms 17.00 ms       Meniere's Disease Screening (2000 Hz): Negative bilaterally  Superior Canal Dehiscence Screening (4000 Hz): Negative bilaterally    Replicable oVEMP responses were present outside of normal limits in both ears.  The amplitude asymmetry ratio of 23% was not significant (>33% = abnormal).      Raw data reviewed by a member of the Vestibular & Balance Disorders team. Testing and interpretation of results completed by Corey Adan CCC-A. It was my pleasure to evaluate this patient.       Corey Adan CCC-A  Clinical Audiologist

## 2024-10-03 ENCOUNTER — CLINICAL SUPPORT (OUTPATIENT)
Dept: AUDIOLOGY | Facility: CLINIC | Age: 74
End: 2024-10-03
Payer: MEDICARE

## 2024-10-03 DIAGNOSIS — R42 DIZZINESS AND GIDDINESS: Primary | ICD-10-CM

## 2024-10-03 PROCEDURE — 92700 UNLISTED ORL SERVICE/PX: CPT

## 2024-10-03 PROCEDURE — 92540 BASIC VESTIBULAR EVALUATION: CPT

## 2024-10-03 PROCEDURE — 92519 VEMP TST I&R CERVICAL&OCULAR: CPT

## 2024-10-03 PROCEDURE — 92537 CALORIC VSTBLR TEST W/REC: CPT

## 2024-10-10 DIAGNOSIS — R42 DIZZINESS AND GIDDINESS: Primary | ICD-10-CM

## 2024-10-10 NOTE — PROGRESS NOTES
Patient called requesting results of  Balance test.  I called and stated that it is NORMAL and origin of dizziness is NOT from ears. From ENT standpoint  patient is cleared. Patient must follow up with neurology.

## 2024-10-24 ENCOUNTER — APPOINTMENT (OUTPATIENT)
Dept: OPHTHALMOLOGY | Facility: CLINIC | Age: 74
End: 2024-10-24
Payer: MEDICARE

## 2024-10-24 DIAGNOSIS — H40.1132 PRIMARY OPEN ANGLE GLAUCOMA (POAG) OF BOTH EYES, MODERATE STAGE: Primary | ICD-10-CM

## 2024-10-24 DIAGNOSIS — H52.4 PRESBYOPIA: ICD-10-CM

## 2024-10-24 DIAGNOSIS — H52.13 MYOPIA, BILATERAL: ICD-10-CM

## 2024-10-24 DIAGNOSIS — H25.813 COMBINED FORM OF AGE-RELATED CATARACT, BOTH EYES: ICD-10-CM

## 2024-10-24 DIAGNOSIS — H52.221 REGULAR ASTIGMATISM OF RIGHT EYE: ICD-10-CM

## 2024-10-24 PROCEDURE — 92012 INTRM OPH EXAM EST PATIENT: CPT | Performed by: OPHTHALMOLOGY

## 2024-10-24 PROCEDURE — 1159F MED LIST DOCD IN RCRD: CPT | Performed by: OPHTHALMOLOGY

## 2024-10-24 PROCEDURE — 92015 DETERMINE REFRACTIVE STATE: CPT | Performed by: OPHTHALMOLOGY

## 2024-10-24 PROCEDURE — 1036F TOBACCO NON-USER: CPT | Performed by: OPHTHALMOLOGY

## 2024-10-24 RX ORDER — BRIMONIDINE TARTRATE 2 MG/ML
1 SOLUTION/ DROPS OPHTHALMIC 3 TIMES DAILY
Qty: 5 ML | Refills: 3 | Status: SHIPPED | OUTPATIENT
Start: 2024-10-24 | End: 2024-11-23

## 2024-10-24 ASSESSMENT — PACHYMETRY
OD_CT(UM): 533
OS_CT(UM): 521

## 2024-10-24 ASSESSMENT — REFRACTION_WEARINGRX
OS_ADD: +2.75
OD_SPHERE: -0.50
OS_SPHERE: -0.50
OD_CYLINDER: -0.50
OD_ADD: +2.75
OD_AXIS: 135

## 2024-10-24 ASSESSMENT — REFRACTION_MANIFEST
OD_SPHERE: -1.00
OD_CYLINDER: -0.50
OS_ADD: +2.75
OD_ADD: +2.75
OS_SPHERE: -0.50
OD_AXIS: 135
OS_CYLINDER: SPHERE

## 2024-10-24 ASSESSMENT — VISUAL ACUITY
METHOD: SNELLEN - LINEAR
OS_CC: 20/20
OD_CC: 20/25
CORRECTION_TYPE: GLASSES

## 2024-10-24 ASSESSMENT — TONOMETRY
IOP_METHOD: GOLDMANN APPLANATION
OS_IOP_MMHG: 18
OD_IOP_MMHG: 17

## 2024-10-24 ASSESSMENT — SLIT LAMP EXAM - LIDS
COMMENTS: NORMAL
COMMENTS: NORMAL

## 2024-10-24 ASSESSMENT — EXTERNAL EXAM - LEFT EYE: OS_EXAM: NORMAL

## 2024-10-24 ASSESSMENT — EXTERNAL EXAM - RIGHT EYE: OD_EXAM: NORMAL

## 2024-10-24 NOTE — PROGRESS NOTES
Assessment/Plan   Diagnoses and all orders for this visit:  Primary open angle glaucoma (POAG) of both eyes, mild stage  -     brimonidine (AlphaGAN) 0.2 % ophthalmic solution; Administer 1 drop into both eyes 3 times a day.  continue to monitor    Combined form of age-related cataract, both eyes  continue to monitor    Myopia, bilateral  Regular astigmatism of right eye  Presbyopia  Refractive error  -give Rx for new glasses at patient's request    Return in  3  month(s) for follow up or sooner if having any problems  Visual field (VF) and rnfl OCT at next visit  -monitor for poss. glaucoma progression

## 2024-10-31 ENCOUNTER — APPOINTMENT (OUTPATIENT)
Dept: NEUROLOGY | Facility: CLINIC | Age: 74
End: 2024-10-31
Payer: MEDICARE

## 2024-10-31 DIAGNOSIS — H93.12 TINNITUS OF LEFT EAR: Primary | ICD-10-CM

## 2024-10-31 DIAGNOSIS — R42 DIZZINESS AND GIDDINESS: ICD-10-CM

## 2024-10-31 PROCEDURE — 99204 OFFICE O/P NEW MOD 45 MIN: CPT | Performed by: PSYCHIATRY & NEUROLOGY

## 2024-11-07 ENCOUNTER — HOSPITAL ENCOUNTER (OUTPATIENT)
Dept: RADIOLOGY | Facility: CLINIC | Age: 74
Discharge: HOME | End: 2024-11-07
Payer: MEDICARE

## 2024-11-07 DIAGNOSIS — R42 DIZZINESS AND GIDDINESS: ICD-10-CM

## 2024-11-07 DIAGNOSIS — H93.12 TINNITUS OF LEFT EAR: ICD-10-CM

## 2024-11-07 PROCEDURE — 70553 MRI BRAIN STEM W/O & W/DYE: CPT | Performed by: RADIOLOGY

## 2024-11-07 PROCEDURE — 2550000001 HC RX 255 CONTRASTS: Performed by: PSYCHIATRY & NEUROLOGY

## 2024-11-07 PROCEDURE — 70553 MRI BRAIN STEM W/O & W/DYE: CPT

## 2024-11-07 PROCEDURE — A9575 INJ GADOTERATE MEGLUMI 0.1ML: HCPCS | Performed by: PSYCHIATRY & NEUROLOGY

## 2024-11-07 RX ORDER — GADOTERATE MEGLUMINE 376.9 MG/ML
19 INJECTION INTRAVENOUS
Status: COMPLETED | OUTPATIENT
Start: 2024-11-07 | End: 2024-11-07

## 2024-11-12 ENCOUNTER — TELEPHONE (OUTPATIENT)
Dept: NEUROLOGY | Facility: CLINIC | Age: 74
End: 2024-11-12
Payer: MEDICARE

## 2024-11-12 NOTE — TELEPHONE ENCOUNTER
I spoke to the patient and reviewed her MRI IAC.  Her MRI IAC was unremarkable.  Recommend follow up with ENT.

## 2025-02-11 ENCOUNTER — APPOINTMENT (OUTPATIENT)
Dept: OPHTHALMOLOGY | Facility: CLINIC | Age: 75
End: 2025-02-11
Payer: MEDICARE

## 2025-02-11 DIAGNOSIS — H52.221 REGULAR ASTIGMATISM OF RIGHT EYE: ICD-10-CM

## 2025-02-11 DIAGNOSIS — H52.4 PRESBYOPIA: ICD-10-CM

## 2025-02-11 DIAGNOSIS — H25.813 COMBINED FORMS OF AGE-RELATED CATARACT OF BOTH EYES: ICD-10-CM

## 2025-02-11 DIAGNOSIS — H52.13 MYOPIA, BILATERAL: ICD-10-CM

## 2025-02-11 DIAGNOSIS — H40.1132 PRIMARY OPEN ANGLE GLAUCOMA (POAG) OF BOTH EYES, MODERATE STAGE: Primary | ICD-10-CM

## 2025-02-11 LAB
AVERAGE RNFL TODAY (OD): 57
AVERAGE RNFL TODAY (OS): 62
C/D RATIO TODAY (OD): 0.71
C/D RATIO TODAY (OS): 0.77

## 2025-02-11 RX ORDER — BRIMONIDINE TARTRATE 2 MG/ML
1 SOLUTION/ DROPS OPHTHALMIC 3 TIMES DAILY
Qty: 15 ML | Refills: 3 | Status: SHIPPED | OUTPATIENT
Start: 2025-02-11 | End: 2025-03-13

## 2025-02-11 ASSESSMENT — ENCOUNTER SYMPTOMS
NEUROLOGICAL NEGATIVE: 0
CARDIOVASCULAR NEGATIVE: 0
ENDOCRINE NEGATIVE: 0
EYES NEGATIVE: 1
HEMATOLOGIC/LYMPHATIC NEGATIVE: 0
PSYCHIATRIC NEGATIVE: 0
RESPIRATORY NEGATIVE: 0
ALLERGIC/IMMUNOLOGIC NEGATIVE: 0
MUSCULOSKELETAL NEGATIVE: 0
CONSTITUTIONAL NEGATIVE: 0
GASTROINTESTINAL NEGATIVE: 0

## 2025-02-11 ASSESSMENT — VISUAL ACUITY
CORRECTION_TYPE: GLASSES
METHOD: SNELLEN - LINEAR

## 2025-02-11 ASSESSMENT — TONOMETRY
IOP_METHOD: GOLDMANN APPLANATION
OS_IOP_MMHG: 16
OD_IOP_MMHG: 16

## 2025-02-11 ASSESSMENT — REFRACTION_WEARINGRX
OS_CYLINDER: SPHERE
OD_CYLINDER: -0.50
OD_ADD: +2.75
OS_ADD: +2.75
OD_SPHERE: -1.00
OD_AXIS: 135
OS_SPHERE: -0.50

## 2025-02-11 ASSESSMENT — REFRACTION_MANIFEST
OD_SPHERE: -1.50
OD_CYLINDER: -1.00
OS_CYLINDER: SPHERE
OS_SPHERE: -0.50
OD_ADD: +2.75
OD_AXIS: 128
OS_ADD: +2.75

## 2025-02-11 ASSESSMENT — EXTERNAL EXAM - RIGHT EYE: OD_EXAM: NORMAL

## 2025-02-11 ASSESSMENT — SLIT LAMP EXAM - LIDS
COMMENTS: NORMAL
COMMENTS: NORMAL

## 2025-02-11 ASSESSMENT — PACHYMETRY
OD_CT(UM): 533
OS_CT(UM): 521

## 2025-02-11 ASSESSMENT — EXTERNAL EXAM - LEFT EYE: OS_EXAM: NORMAL

## 2025-02-11 NOTE — PROGRESS NOTES
Assessment/Plan   Diagnoses and all orders for this visit:  Primary open angle glaucoma (POAG) of both eyes, moderate stage  -     OCT, Optic Nerve - OU - Both Eyes  -     [place] Visual Field - OU - Both Eyes  -     brimonidine (AlphaGAN) 0.2 % ophthalmic solution; Administer 1 drop into both eyes 3 times a day.  RNFL thinning  both eyes  -increase Brimonidine to tid both eyes    Refer to Glaucoma Service for evaluation and management for glaucoma progression    Combined forms of age-related cataract of both eyes  continue to monitor  Not visually significant at the present time    Myopia, bilateral  Regular astigmatism of right eye  Presbyopia  Refractive error  -give Rx for new glasses    Squishing sound behind left eye and by left ear    Refer to Neuro-ophthalmology for evaluation and management    Return in 3   month(s) for follow up or sooner if having any problems

## 2025-02-25 ENCOUNTER — APPOINTMENT (OUTPATIENT)
Dept: OPHTHALMOLOGY | Facility: CLINIC | Age: 75
End: 2025-02-25
Payer: MEDICARE

## 2025-03-04 ENCOUNTER — APPOINTMENT (OUTPATIENT)
Dept: OPHTHALMOLOGY | Facility: CLINIC | Age: 75
End: 2025-03-04
Payer: MEDICARE

## 2025-03-12 NOTE — PROGRESS NOTES
3/14/2025 CC: 74 y.o. presents for glaucoma evaluation. Referred by Dr Marquez    Past ocular history: POAG  Family history: none  Past medical history: HTN, breast C  Social history: denies tobacco     Eye medications:   Both eyes: Brimonidine tid  Ats PRN     Previous: Latanoprost (burning)  Allergy:  As per chart     Testing:    HVF 24-2 3/14/2025: OD- mild GD, SAD, ? paracentral depression, IAD, MD -7.2. OS- mild GD, SAD>IAD, MD -6.1 dB    GCA 3/14/2025: thin S/T/I OU. Mac.: Regular macular contour, /270  OCT 3/14/2025: OD- thin I/S, bdl T, avg 57. OS- thin I/S, avg 62 um.    Pachymetry 3/14/2025: 521/515    Gonioscopy 3/14/2025: open    Tmax: 21 OU    Assessment:   Primary open angle glaucoma, moderate OU  -Negative fjx  -Thinner C  -Thin rim IT OU  - Testing: stable, VF confounded by cataract.  - Burning with Latanoprost qhs (used in morning), compliant w/ Brimonidine since 8/2024  Target IOP: low teens  -IOP 3/14/2025:13 OU. CPM. Schedule for SLT OD 1st.    NS cataract  -bdl OD>OS    RE  - FU Dr Marquez  - Myopia, low ast., presbyopia   - Verified specs, probably lenses were switched.     Plan:   I explained my findings. POAG, IOP 13 OU.     Eye medications:   Both eyes: continue Brimonidine     Return in 1 mo, IOP check/SLT OD

## 2025-03-14 ENCOUNTER — APPOINTMENT (OUTPATIENT)
Dept: OPHTHALMOLOGY | Facility: CLINIC | Age: 75
End: 2025-03-14
Payer: MEDICARE

## 2025-03-14 DIAGNOSIS — H25.813 COMBINED FORMS OF AGE-RELATED CATARACT OF BOTH EYES: Primary | ICD-10-CM

## 2025-03-14 DIAGNOSIS — H40.1132 PRIMARY OPEN ANGLE GLAUCOMA (POAG) OF BOTH EYES, MODERATE STAGE: ICD-10-CM

## 2025-03-14 RX ORDER — BRIMONIDINE TARTRATE 2 MG/ML
1 SOLUTION/ DROPS OPHTHALMIC 3 TIMES DAILY
Qty: 15 ML | Refills: 6 | Status: SHIPPED | OUTPATIENT
Start: 2025-03-14

## 2025-03-14 RX ORDER — LATANOPROST 50 UG/ML
1 SOLUTION/ DROPS OPHTHALMIC 3 TIMES DAILY
COMMUNITY

## 2025-03-14 ASSESSMENT — CONF VISUAL FIELD
OS_NORMAL: 1
OS_INFERIOR_TEMPORAL_RESTRICTION: 0
OD_INFERIOR_TEMPORAL_RESTRICTION: 0
OD_NORMAL: 1
OD_SUPERIOR_TEMPORAL_RESTRICTION: 0
OS_INFERIOR_NASAL_RESTRICTION: 0
OD_INFERIOR_NASAL_RESTRICTION: 0
OS_SUPERIOR_NASAL_RESTRICTION: 0
OS_SUPERIOR_TEMPORAL_RESTRICTION: 0
OD_SUPERIOR_NASAL_RESTRICTION: 0

## 2025-03-14 ASSESSMENT — TONOMETRY
OD_IOP_MMHG: 13
IOP_METHOD: TONOPEN
OS_IOP_MMHG: 13
OS_IOP_MMHG: 11
IOP_METHOD: GOLDMANN APPLANATION
OD_IOP_MMHG: 10

## 2025-03-14 ASSESSMENT — VISUAL ACUITY
OS_CC: 20/20-3
CORRECTION_TYPE: GLASSES
OS_SC: 20/20
OS_BAT_HIGH: 20/25
METHOD: SNELLEN - LINEAR
OD_SC: 20/60-2
OD_CC: 20/80
OD_PH_SC: 20/30-2

## 2025-03-14 ASSESSMENT — REFRACTION_MANIFEST
OS_SPHERE: -0.50
OD_AXIS: 128
OS_ADD: +2.75
OD_ADD: +2.75
OD_SPHERE: -1.50
OD_CYLINDER: -1.00
OS_CYLINDER: SPHERE

## 2025-03-14 ASSESSMENT — REFRACTION_WEARINGRX
OD_CYLINDER: SPHERE
OD_SPHERE: -0.50
OS_SPHERE: -1.50
OS_CYLINDER: -1.00
OS_ADD: +2.75
OS_AXIS: 128
SPECS_TYPE: PAL
OD_ADD: +2.75

## 2025-03-14 ASSESSMENT — GONIOSCOPY
OS_INFERIOR: PTM
OD_INFERIOR: PTM
OD_SUPERIOR: ATM
OS_TEMPORAL: PTM
OS_NASAL: PTM
OD_NASAL: PTM
OD_TEMPORAL: PTM
OS_SUPERIOR: ATM

## 2025-03-14 ASSESSMENT — CUP TO DISC RATIO
OS_RATIO: 0.8
OD_RATIO: 0.8

## 2025-03-14 ASSESSMENT — PACHYMETRY
OD_CT(UM): 521
OS_CT(UM): 515

## 2025-03-14 ASSESSMENT — EXTERNAL EXAM - LEFT EYE: OS_EXAM: NORMAL

## 2025-03-14 ASSESSMENT — ENCOUNTER SYMPTOMS: EYES NEGATIVE: 1

## 2025-03-14 ASSESSMENT — SLIT LAMP EXAM - LIDS
COMMENTS: NORMAL
COMMENTS: NORMAL

## 2025-03-14 ASSESSMENT — EXTERNAL EXAM - RIGHT EYE: OD_EXAM: NORMAL

## 2025-04-09 NOTE — PROGRESS NOTES
4/11/2025 CC: 74 y.o. presents for glaucoma FU w/ SLT OD    Past ocular history: POAG  Family history: none  Past medical history: HTN, breast C  Social history: denies tobacco     Eye medications:   Both eyes: Brimonidine tid  Ats PRN     Previous: Latanoprost (burning)  Allergy:  As per chart     Testing:    HVF 24-2 3/14/2025: OD- mild GD, SAD, ? paracentral depression, IAD, MD -7.2. OS- mild GD, SAD>IAD, MD -6.1 dB    GCA 3/14/2025: thin S/T/I OU. Mac.: Regular macular contour, /270  OCT 3/14/2025: OD- thin I/S, bdl T, avg 57. OS- thin I/S, avg 62 um.    Pachymetry 3/14/2025: 521/515    Gonioscopy 3/14/2025: open, narrow S.    Tmax: 21 OU    Assessment:   Primary open angle glaucoma, moderate OU  -Negative fhx  -Thinner C  - Thin rim IT OU  - Testing: stable, VF confounded by cataract.  - Burning with Latanoprost qhs (used in morning), compliant w/ Brimonidine since 8/2024  Target IOP: low teens  - IOP 3/14/2025:13 OU. CPM. Schedule for SLT OD 1st.  4/11/2025: IOP 13/12. Stable exam. SLT OD.    NS cataract  - bdl OD>OS  - Possible CMG, will benefit from CE    RE  - FU Dr Marquez  - Myopia, low ast., presbyopia   - Verified specs, probably lenses were switched.     Plan:   I explained my findings. POAG, IOP 13/12. SLT OD    Eye medications:   Both eyes: continue Brimonidine     Explained R/B/A SLT OD. Patient would like to proceed.  Iopidine/Brimonidine pre,    Laser settings:  360 degree   Total shots: 90  Energy: 1.0 mj    Iopidine post,  IOP Pre 13, IOP Post 17    Ketorolac  QID for 4 days    Return in 1 mo, IOP check/SLT OS

## 2025-04-11 ENCOUNTER — APPOINTMENT (OUTPATIENT)
Dept: OPHTHALMOLOGY | Facility: CLINIC | Age: 75
End: 2025-04-11
Payer: MEDICARE

## 2025-04-11 DIAGNOSIS — H25.813 COMBINED FORMS OF AGE-RELATED CATARACT OF BOTH EYES: Primary | ICD-10-CM

## 2025-04-11 DIAGNOSIS — H40.1132 PRIMARY OPEN ANGLE GLAUCOMA (POAG) OF BOTH EYES, MODERATE STAGE: ICD-10-CM

## 2025-04-11 DIAGNOSIS — Z98.890 S/P LASER TRABECULOPLASTY OF EYE: ICD-10-CM

## 2025-04-11 RX ORDER — KETOROLAC TROMETHAMINE 5 MG/ML
1 SOLUTION OPHTHALMIC 4 TIMES DAILY
Qty: 5 ML | Refills: 0 | Status: SHIPPED | OUTPATIENT
Start: 2025-04-11 | End: 2025-04-15

## 2025-04-11 ASSESSMENT — CUP TO DISC RATIO
OS_RATIO: 0.8
OD_RATIO: 0.8

## 2025-04-11 ASSESSMENT — CONF VISUAL FIELD
OD_INFERIOR_TEMPORAL_RESTRICTION: 0
OS_SUPERIOR_NASAL_RESTRICTION: 0
OD_SUPERIOR_NASAL_RESTRICTION: 0
OS_SUPERIOR_TEMPORAL_RESTRICTION: 0
OS_INFERIOR_NASAL_RESTRICTION: 0
OD_NORMAL: 1
OS_INFERIOR_TEMPORAL_RESTRICTION: 0
OD_INFERIOR_NASAL_RESTRICTION: 0
OS_NORMAL: 1
OD_SUPERIOR_TEMPORAL_RESTRICTION: 0

## 2025-04-11 ASSESSMENT — TONOMETRY
IOP_METHOD: GOLDMANN APPLANATION
OD_IOP_MMHG: 17
OD_IOP_MMHG: 13
OS_IOP_MMHG: 12
IOP_METHOD: GOLDMANN APPLANATION

## 2025-04-11 ASSESSMENT — VISUAL ACUITY
CORRECTION_TYPE: GLASSES
OD_SC: 20/60
METHOD: SNELLEN - LINEAR
OD_PH_SC: 20/30
OS_SC: 20/20
OD_SC+: -1

## 2025-04-11 ASSESSMENT — SLIT LAMP EXAM - LIDS
COMMENTS: NORMAL
COMMENTS: NORMAL

## 2025-04-11 ASSESSMENT — PACHYMETRY
OD_CT(UM): 521
OS_CT(UM): 515

## 2025-04-11 ASSESSMENT — EXTERNAL EXAM - LEFT EYE: OS_EXAM: NORMAL

## 2025-04-11 ASSESSMENT — EXTERNAL EXAM - RIGHT EYE: OD_EXAM: NORMAL

## 2025-05-13 ENCOUNTER — APPOINTMENT (OUTPATIENT)
Dept: OPHTHALMOLOGY | Facility: CLINIC | Age: 75
End: 2025-05-13
Payer: COMMERCIAL

## 2025-05-13 DIAGNOSIS — H04.123 DRY EYES: ICD-10-CM

## 2025-05-13 DIAGNOSIS — Z98.890 S/P LASER TRABECULOPLASTY OF EYE: ICD-10-CM

## 2025-05-13 DIAGNOSIS — H40.1132 PRIMARY OPEN ANGLE GLAUCOMA (POAG) OF BOTH EYES, MODERATE STAGE: Primary | ICD-10-CM

## 2025-05-13 DIAGNOSIS — H25.813 COMBINED FORMS OF AGE-RELATED CATARACT OF BOTH EYES: ICD-10-CM

## 2025-05-13 PROCEDURE — 92012 INTRM OPH EXAM EST PATIENT: CPT | Performed by: OPHTHALMOLOGY

## 2025-05-13 ASSESSMENT — EXTERNAL EXAM - RIGHT EYE: OD_EXAM: NORMAL

## 2025-05-13 ASSESSMENT — REFRACTION_WEARINGRX
OD_AXIS: 128
OD_ADD: +2.75
OS_ADD: +2.75
OS_SPHERE: -0.50
OD_SPHERE: -1.00
OD_CYLINDER: -0.50

## 2025-05-13 ASSESSMENT — REFRACTION_MANIFEST
OS_ADD: +2.75
OD_ADD: +2.75
OD_SPHERE: -2.50
OD_AXIS: 130
OS_SPHERE: +0.25
OD_CYLINDER: -1.00

## 2025-05-13 ASSESSMENT — ENCOUNTER SYMPTOMS: EYES NEGATIVE: 1

## 2025-05-13 ASSESSMENT — VISUAL ACUITY
OD_PH_CC: 20/25
OD_CC: 20/150
OS_CC: 20/25
METHOD: SNELLEN - LINEAR

## 2025-05-13 ASSESSMENT — SLIT LAMP EXAM - LIDS
COMMENTS: NORMAL
COMMENTS: NORMAL

## 2025-05-13 ASSESSMENT — EXTERNAL EXAM - LEFT EYE: OS_EXAM: NORMAL

## 2025-05-13 ASSESSMENT — TONOMETRY
OS_IOP_MMHG: 17
OD_IOP_MMHG: 18
IOP_METHOD: GOLDMANN APPLANATION

## 2025-05-13 ASSESSMENT — PACHYMETRY
OS_CT(UM): 515
OD_CT(UM): 521

## 2025-05-13 NOTE — PROGRESS NOTES
"Assessment/Plan   Diagnoses and all orders for this visit:  Primary open angle glaucoma (POAG) of both eyes, moderate stage  S/P laser trabeculoplasty of eye  -intraocular pressure (IOP) slightly elevated in  both eyes  -continue with Brimonidine both eyes tid  -keep appointment with Dr. Montague for glaucoma management    Combined forms of age-related cataract of both eyes  continue to monitor    Dry eyes  -Start artificial tears both eyes (OU) qid  -stress compliance    Keep neuro-ophthalmology appointment   -pt experiencing \"squishy\" feeling behind left eye    Return in  4  month(s) for follow up or sooner if having any problems    "

## 2025-06-02 NOTE — PROGRESS NOTES
6/3/2025 CC: 74 y.o. presents for glaucoma FU w/ IOP check, s/p SLT OD, 4/11/2025    Past ocular history: POAG  Family history: none  Past medical history: HTN, breast C  Social history: denies tobacco     Eye medications:   Both eyes: Brimonidine tid  Ats PRN     Previous: Latanoprost (burning)  Allergy:  As per chart     Testing:    HVF 24-2 3/14/2025: OD- mild GD, SAD, ? paracentral depression, IAD, MD -7.2. OS- mild GD, SAD>IAD, MD -6.1 dB    GCA 3/14/2025: thin S/T/I OU. Mac.: Regular macular contour, /270  OCT 3/14/2025: OD- thin I/S, bdl T, avg 57. OS- thin I/S, avg 62 um.    Pachymetry 3/14/2025: 521/515    Gonioscopy 3/14/2025: open, narrow S.    Tmax: 21 OU    Assessment:   Primary open angle glaucoma, moderate OU  - Negative fhx  - Thinner C  - Thin rim IT OU  - Testing: stable, VF confounded by cataract.  - Burning with Latanoprost qhs (used in morning), compliant w/ Brimonidine since 8/2024  Target IOP: low teens  - IOP 3/14/2025:13 OU. CPM. Schedule for SLT OD 1st.  4/11/2025: IOP 13/12. Stable exam. SLT OD.  - 6/3/2025: IOP 12/14. Stable    NS cataract  - bdl OD>OS  - Possible CMG, will benefit from CE    RE  - FU Dr Marquez  - Myopia, low ast., presbyopia   - Verified specs, probably lenses were switched.     Plan:   I explained my findings. POAG, IOP 12/14    Eye medications:   Both eyes: continue Brimonidine tid    Return in 1 mo, IOP check/SLT OS

## 2025-06-03 ENCOUNTER — APPOINTMENT (OUTPATIENT)
Dept: OPHTHALMOLOGY | Facility: CLINIC | Age: 75
End: 2025-06-03
Payer: MEDICARE

## 2025-06-03 DIAGNOSIS — Z98.890 S/P LASER TRABECULOPLASTY OF EYE: Primary | ICD-10-CM

## 2025-06-03 PROCEDURE — 99024 POSTOP FOLLOW-UP VISIT: CPT | Performed by: OPHTHALMOLOGY

## 2025-06-03 RX ORDER — LATANOPROST/PF 0.005 %
DROPS OPHTHALMIC (EYE)
COMMUNITY
Start: 2023-09-19

## 2025-06-03 ASSESSMENT — ENCOUNTER SYMPTOMS
MUSCULOSKELETAL NEGATIVE: 0
NEUROLOGICAL NEGATIVE: 0
RESPIRATORY NEGATIVE: 0
EYES NEGATIVE: 1
PSYCHIATRIC NEGATIVE: 0
ENDOCRINE NEGATIVE: 0
CARDIOVASCULAR NEGATIVE: 0
HEMATOLOGIC/LYMPHATIC NEGATIVE: 0
CONSTITUTIONAL NEGATIVE: 0
GASTROINTESTINAL NEGATIVE: 0
ALLERGIC/IMMUNOLOGIC NEGATIVE: 0

## 2025-06-03 ASSESSMENT — EXTERNAL EXAM - RIGHT EYE: OD_EXAM: NORMAL

## 2025-06-03 ASSESSMENT — CONF VISUAL FIELD
OS_INFERIOR_TEMPORAL_RESTRICTION: 0
OD_NORMAL: 1
METHOD: COUNTING FINGERS
OS_SUPERIOR_NASAL_RESTRICTION: 0
OS_NORMAL: 1
OD_SUPERIOR_TEMPORAL_RESTRICTION: 0
OD_INFERIOR_TEMPORAL_RESTRICTION: 0
OS_INFERIOR_NASAL_RESTRICTION: 0
OD_SUPERIOR_NASAL_RESTRICTION: 0
OS_SUPERIOR_TEMPORAL_RESTRICTION: 0
OD_INFERIOR_NASAL_RESTRICTION: 0

## 2025-06-03 ASSESSMENT — REFRACTION_WEARINGRX
OD_ADD: +2.75
OS_ADD: +2.75
OD_AXIS: 128
OS_SPHERE: -0.50
OD_SPHERE: -1.00
OD_CYLINDER: -0.50

## 2025-06-03 ASSESSMENT — SLIT LAMP EXAM - LIDS
COMMENTS: NORMAL
COMMENTS: NORMAL

## 2025-06-03 ASSESSMENT — PACHYMETRY
OS_CT(UM): 515
OD_CT(UM): 521

## 2025-06-03 ASSESSMENT — EXTERNAL EXAM - LEFT EYE: OS_EXAM: NORMAL

## 2025-06-03 ASSESSMENT — TONOMETRY
OS_IOP_MMHG: 14
IOP_METHOD: GOLDMANN APPLANATION
OD_IOP_MMHG: 12

## 2025-06-03 ASSESSMENT — VISUAL ACUITY
OD_SC+: +1
OD_SC: 20/70
OS_SC+: -2
METHOD: SNELLEN - LINEAR
OD_PH_SC: 20/30
OS_SC: 20/25

## 2025-07-07 NOTE — PROGRESS NOTES
7/8/2025 CC: 74 y.o. presents for glaucoma FU w/ SLT OS    Past ocular history: POAG  Family history: none  Past medical history: HTN, breast C  Social history: denies tobacco     Eye medications:   Both eyes: Brimonidine tid  Ats PRN     Previous: Latanoprost (burning)  Allergy:  As per chart     Testing:    HVF 24-2 3/14/2025: OD- mild GD, SAD, ? paracentral depression, IAD, MD -7.2. OS- mild GD, SAD>IAD, MD -6.1 dB    GCA 3/14/2025: thin S/T/I OU. Mac.: Regular macular contour, /270  OCT 3/14/2025: OD- thin I/S, bdl T, avg 57. OS- thin I/S, avg 62 um.    Pachymetry 3/14/2025: 521/515    Gonioscopy 3/14/2025: open, narrow S.    Tmax: 21 OU    Assessment:   Primary open angle glaucoma, moderate OU  - Negative fhx  - Thinner C  - Thin rim IT OU  - Testing: stable, VF confounded by cataract.  - Burning with Latanoprost qhs (used in morning), compliant w/ Brimonidine since 8/2024  Target IOP: low teens  - IOP 3/14/2025:13 OU. CPM. Schedule for SLT OD 1st.  4/11/2025: IOP 13/12. Stable exam. SLT OD.  - 6/3/2025: IOP 12/14. Stable  7/8/2025: IOP 12/13. SLT OS    NS cataract  - bdl OD>OS  - Possible CMG, will benefit from CE    RE  - FU Dr Marquez  - Myopia, low ast., presbyopia   - Verified specs, probably lenses were switched.     Plan:   I explained my findings. POAG, SLT OS    Eye medications:   Both eyes: continue Brimonidine tid    Explained R/B/A SLT OS. Patient would like to proceed.  Iopidine/Brimonidine pre,    Laser settings:  360 degree   Total shots: 90  Energy: 1.0 mj      Iopidine post,  IOP Pre 13, IOP Post 14    Ketorolac  QID for 4 days    RTC 1 mo, IOP check

## 2025-07-08 ENCOUNTER — APPOINTMENT (OUTPATIENT)
Dept: OPHTHALMOLOGY | Facility: CLINIC | Age: 75
End: 2025-07-08
Payer: MEDICARE

## 2025-07-08 DIAGNOSIS — H40.1122 PRIMARY OPEN ANGLE GLAUCOMA (POAG) OF LEFT EYE, MODERATE STAGE: Primary | ICD-10-CM

## 2025-07-08 DIAGNOSIS — Z98.890 S/P LASER TRABECULOPLASTY OF EYE: ICD-10-CM

## 2025-07-08 PROCEDURE — 65855 TRABECULOPLASTY LASER SURG: CPT | Mod: LEFT SIDE | Performed by: OPHTHALMOLOGY

## 2025-07-08 RX ORDER — KETOROLAC TROMETHAMINE 5 MG/ML
1 SOLUTION OPHTHALMIC 4 TIMES DAILY
Qty: 5 ML | Refills: 0 | Status: SHIPPED | OUTPATIENT
Start: 2025-07-08 | End: 2025-07-12

## 2025-07-08 ASSESSMENT — TONOMETRY
IOP_METHOD: GOLDMANN APPLANATION
IOP_METHOD: GOLDMANN APPLANATION
OS_IOP_MMHG: 14
OD_IOP_MMHG: 12
OS_IOP_MMHG: 13
OD_IOP_MMHG: 13

## 2025-07-08 ASSESSMENT — EXTERNAL EXAM - RIGHT EYE: OD_EXAM: NORMAL

## 2025-07-08 ASSESSMENT — PATIENT HEALTH QUESTIONNAIRE - PHQ9
SUM OF ALL RESPONSES TO PHQ9 QUESTIONS 1 AND 2: 0
1. LITTLE INTEREST OR PLEASURE IN DOING THINGS: NOT AT ALL
2. FEELING DOWN, DEPRESSED OR HOPELESS: NOT AT ALL

## 2025-07-08 ASSESSMENT — PACHYMETRY
OS_CT(UM): 515
OD_CT(UM): 521

## 2025-07-08 ASSESSMENT — VISUAL ACUITY
CORRECTION_TYPE: GLASSES
OD_PH_CC: 20/20
OS_CC: 20/20
OD_PH_CC+: -2
OD_CC: 20/60
OS_CC+: -1
OD_CC+: +2
METHOD: SNELLEN - SINGLE

## 2025-07-08 ASSESSMENT — SLIT LAMP EXAM - LIDS
COMMENTS: NORMAL
COMMENTS: NORMAL

## 2025-07-08 ASSESSMENT — PAIN SCALES - GENERAL: PAINLEVEL_OUTOF10: 0-NO PAIN

## 2025-07-08 ASSESSMENT — EXTERNAL EXAM - LEFT EYE: OS_EXAM: NORMAL

## 2025-07-18 ENCOUNTER — APPOINTMENT (OUTPATIENT)
Dept: OPHTHALMOLOGY | Facility: CLINIC | Age: 75
End: 2025-07-18
Payer: MEDICARE

## 2025-07-18 DIAGNOSIS — H69.92 EUSTACHIAN TUBE DISORDER, LEFT: ICD-10-CM

## 2025-07-18 DIAGNOSIS — H53.8 BLURRED VISION: Primary | ICD-10-CM

## 2025-07-18 ASSESSMENT — CONF VISUAL FIELD
OD_SUPERIOR_NASAL_RESTRICTION: 0
OD_NORMAL: 1
METHOD: COUNTING FINGERS
OS_NORMAL: 1
OD_INFERIOR_NASAL_RESTRICTION: 0
OS_SUPERIOR_TEMPORAL_RESTRICTION: 0
OD_INFERIOR_TEMPORAL_RESTRICTION: 0
OS_INFERIOR_NASAL_RESTRICTION: 0
OS_INFERIOR_TEMPORAL_RESTRICTION: 0
OS_SUPERIOR_NASAL_RESTRICTION: 0
OD_SUPERIOR_TEMPORAL_RESTRICTION: 0

## 2025-07-18 ASSESSMENT — ENCOUNTER SYMPTOMS
GASTROINTESTINAL NEGATIVE: 0
HEMATOLOGIC/LYMPHATIC NEGATIVE: 0
ENDOCRINE NEGATIVE: 0
EYES NEGATIVE: 1
RESPIRATORY NEGATIVE: 0
PSYCHIATRIC NEGATIVE: 0
CONSTITUTIONAL NEGATIVE: 0
MUSCULOSKELETAL NEGATIVE: 0
ALLERGIC/IMMUNOLOGIC NEGATIVE: 0
NEUROLOGICAL NEGATIVE: 0
CARDIOVASCULAR NEGATIVE: 0

## 2025-07-18 ASSESSMENT — EXTERNAL EXAM - LEFT EYE: OS_EXAM: NORMAL

## 2025-07-18 ASSESSMENT — PACHYMETRY
OD_CT(UM): 521
OS_CT(UM): 515

## 2025-07-18 ASSESSMENT — VISUAL ACUITY
OD_PH_SC: 20/30
OD_SC: 20/300
METHOD: SNELLEN - LINEAR
OS_PH_SC: 20/25
OS_SC: 20/30

## 2025-07-18 ASSESSMENT — SLIT LAMP EXAM - LIDS
COMMENTS: NORMAL
COMMENTS: NORMAL

## 2025-07-18 ASSESSMENT — EXTERNAL EXAM - RIGHT EYE: OD_EXAM: NORMAL

## 2025-07-18 ASSESSMENT — CUP TO DISC RATIO
OS_RATIO: 0.8
OD_RATIO: 0.8

## 2025-07-18 ASSESSMENT — TONOMETRY
IOP_METHOD: GOLDMANN APPLANATION
OS_IOP_MMHG: 17
OD_IOP_MMHG: 16

## 2025-07-18 NOTE — PROGRESS NOTES
"Assessment and Plan    11/07/2024 MRI IAC with contrast, which I personally reviewed, shows volume loss and primarily periventricular white matter FLAIR lesions.    03/14/2025 OCT macula OD few drusen, normal foveal contour 252 & OS few drusen, normal foveal contour 270.  02/11/2025 OCT RNFL OD 57 with S, I & borderline T thinning & OS 62 with S & T thinning. (Cirrus)  10/19/2023 OCT RNFL OD 59 & OS 61. (Cirrus)  09/19/2023 OCT RNFL OD 63 & OS 62. (Cirrus)    02/11/2025 HVF 24-2 OD general reduction MD -7.20 & OS general reduction MD -6.17.  10/19/2023 HVF 24-2 OD inferior altitudinal & superior arcuate MD -6.75 & OS inferior > superior nasal steps + superior temporal MD -4.25.    This 74 year-old woman with a history of breast cancer, primary open angle glaucoma (POAG) status post laser trabeculoplasty, ocular hypertension, dry eye, HTN, HLD, obesity, OA presents for evaluation of \"squishy\" feeling behind the left eye.    The neuro-ophthalmology evaluation is normal. I see no nystagmus or other related findings to dizziness. In her history, I wonder about eustachian tube dysfunction and will query otolaryngology about this possibility and further testing for a patulous eustachian tube. I had considered oculopalatal tremor, but I do not see related eye movement or palate problems.    No neuro-ophthalmology follow up. (Dilated 7/18/2025)  "

## 2025-07-18 NOTE — LETTER
"July 18, 2025     Breanna Marquez MD   [address]  [address]  [facility] [address]    Patient: Christine Fong   YOB: 1950   Date of Visit: 7/18/2025     Dear Dr. Breanna Marquez MD:    I am writing to share my findings regarding our shared patient Christine Fong from her visit with me on 7/18/2025.    HPI    This 74 year-old woman with a history of breast cancer, primary open angle glaucoma (POAG) status post laser trabeculoplasty, ocular hypertension, dry eye, HTN, HLD, obesity, OA presents for evaluation of \"squishy\" feeling behind the left eye.    Dr. Joe Marquez recommended evaluation for a \"squishy\" feeling behind her eye.    She describes popping noises she hears in her ear with a dizzy feeling of being off balance. Her vision is blurred some of the time. The sound is like static electricity. Episodes can last up to 10 seconds. She has not had an ear examination. The problem has been for 2-3 years.    She saw otolaryngology NP Yuko Gastelum 8/21/2024 and 10/3/2024 who cleaned the ear canal and tested her with ENG that was unrevealing.    She has seen neurologist Dr. Crescencio Santos 10/31/2024 who ordered MRI IAC, but found no abnormality correlating with her experiences.    Last edited by Bubba Snyder MD PhD on 7/18/2025  4:22 PM.        Diagnoses    Diagnoses and all orders for this visit:  Blurred vision (Primary)  Eustachian tube disorder, left    Assessment and Plan    11/07/2024 MRI IAC with contrast, which I personally reviewed, shows volume loss and primarily periventricular white matter FLAIR lesions.    03/14/2025 OCT macula OD few drusen, normal foveal contour 252 & OS few drusen, normal foveal contour 270.  02/11/2025 OCT RNFL OD 57 with S, I & borderline T thinning & OS 62 with S & T thinning. (Cirrus)  10/19/2023 OCT RNFL OD 59 & OS 61. (Cirrus)  09/19/2023 OCT RNFL OD 63 & OS 62. (Cirrus)    02/11/2025 HVF 24-2 OD general reduction MD -7.20 & OS general reduction MD " "-6.17.  10/19/2023 HVF 24-2 OD inferior altitudinal & superior arcuate MD -6.75 & OS inferior > superior nasal steps + superior temporal MD -4.25.    This 74 year-old woman with a history of breast cancer, primary open angle glaucoma (POAG) status post laser trabeculoplasty, ocular hypertension, dry eye, HTN, HLD, obesity, OA presents for evaluation of \"squishy\" feeling behind the left eye.    The neuro-ophthalmology evaluation is normal. I see no nystagmus or other related findings to dizziness. In her history, I wonder about eustachian tube dysfunction and will query otolaryngology about this possibility and further testing for a patulous eustachian tube. I had considered oculopalatal tremor, but I do not see related eye movement or palate problems.    No neuro-ophthalmology follow up. (Dilated 7/18/2025)      Below you will find my full examination. I appreciate the opportunity to see Christine Fong today and to share in her care with you. Please contact me if you have questions for me regarding this visit or if I can be of assistance to another of your patients with neuro-ophthalmological problems.    Sincerely,    Bubba Snyder MD PhD    CC:   MD Yuko Tinoco, APRN-[facility]      Base Eye Exam       Visual Acuity (Snellen - Linear)         Right Left    Dist sc 20/300 20/30    Dist ph sc 20/30 20/25              Tonometry (Goldmann Applanation, 3:36 PM)         Right Left    Pressure 16 17              Pupils         Pupils Dark Light Shape React APD    Right PERRL, No APD 5 3 Round Brisk None    Left PERRL, No APD 5 3 Round Brisk None              Visual Fields (Counting fingers)         Left Right     Full Full              Extraocular Movement         Right Left     Full, Ortho Full, Ortho   No internuclear ophthalmoplegia with saccades.             Neuro/Psych       Oriented x3: Yes    Mood/Affect: Normal              Dilation       Both eyes: 1% Mydriacyl & 2.5% Ky  @ 4:15 PM      "             Additional Tests       Color         Right Left    [place] 11/11 11/11                  Additional Notes    Pronator drift absent.  Finger to nose normal.  Gait normal.       Cranial Nerves       CN V         Right Left    Overall Normal Normal              CN IX-X         Right Left    Overall Normal Normal              CN VII         Right Left    Overall Normal Normal              CN XI         Right Left    Overall Normal Normal              CN VIII       Hearing: Decreased bilateral hearing              CN XII         Right Left    Overall Normal Normal                  Slit Lamp and Fundus Exam       External Exam         Right Left    External Normal Normal              Slit Lamp Exam         Right Left    Lids/Lashes Normal Normal    Conjunctiva/Sclera Pinguecula Pinguecula    Cornea Clear Clear    Anterior Chamber Deep and quiet Deep and quiet    Iris Round and reactive Round and reactive    Lens 2+ Nuclear sclerosis, Cortical spokes 2+ Nuclear sclerosis, Cortical spokes    Anterior Vitreous Normal Normal              Fundus Exam         Right Left    Disc thin rim IT thin rim IT    C/D Ratio 0.8 0.8    Macula Normal Normal    Vessels Normal Normal    Periphery Normal Normal

## 2025-08-13 ENCOUNTER — APPOINTMENT (OUTPATIENT)
Dept: OPHTHALMOLOGY | Facility: CLINIC | Age: 75
End: 2025-08-13
Payer: MEDICARE

## 2025-08-13 DIAGNOSIS — H40.1132 PRIMARY OPEN ANGLE GLAUCOMA (POAG) OF BOTH EYES, MODERATE STAGE: Primary | ICD-10-CM

## 2025-08-13 PROCEDURE — 99212 OFFICE O/P EST SF 10 MIN: CPT | Performed by: OPHTHALMOLOGY

## 2025-08-13 ASSESSMENT — EXTERNAL EXAM - LEFT EYE: OS_EXAM: NORMAL

## 2025-08-13 ASSESSMENT — VISUAL ACUITY
OS_CC: 20/20
METHOD: SNELLEN - LINEAR
OD_CC: 20/125
OD_PH_CC: 20/40
OS_CC+: -2
CORRECTION_TYPE: GLASSES

## 2025-08-13 ASSESSMENT — ENCOUNTER SYMPTOMS
NEUROLOGICAL NEGATIVE: 0
HEMATOLOGIC/LYMPHATIC NEGATIVE: 0
ALLERGIC/IMMUNOLOGIC NEGATIVE: 0
CARDIOVASCULAR NEGATIVE: 0
MUSCULOSKELETAL NEGATIVE: 0
GASTROINTESTINAL NEGATIVE: 0
PSYCHIATRIC NEGATIVE: 0
RESPIRATORY NEGATIVE: 0
ENDOCRINE NEGATIVE: 0
EYES NEGATIVE: 1
CONSTITUTIONAL NEGATIVE: 0

## 2025-08-13 ASSESSMENT — PACHYMETRY
OD_CT(UM): 521
OS_CT(UM): 515

## 2025-08-13 ASSESSMENT — CUP TO DISC RATIO
OS_RATIO: 0.8
OD_RATIO: 0.8

## 2025-08-13 ASSESSMENT — TONOMETRY
OD_IOP_MMHG: 12
IOP_METHOD: GOLDMANN APPLANATION
OS_IOP_MMHG: 11

## 2025-08-13 ASSESSMENT — EXTERNAL EXAM - RIGHT EYE: OD_EXAM: NORMAL

## 2025-08-13 ASSESSMENT — SLIT LAMP EXAM - LIDS
COMMENTS: NORMAL
COMMENTS: NORMAL

## 2025-09-17 ENCOUNTER — APPOINTMENT (OUTPATIENT)
Dept: OPHTHALMOLOGY | Facility: CLINIC | Age: 75
End: 2025-09-17
Payer: MEDICARE

## 2025-12-02 ENCOUNTER — APPOINTMENT (OUTPATIENT)
Dept: OPHTHALMOLOGY | Facility: CLINIC | Age: 75
End: 2025-12-02
Payer: MEDICARE